# Patient Record
Sex: MALE | Race: OTHER | HISPANIC OR LATINO | Employment: UNEMPLOYED | URBAN - METROPOLITAN AREA
[De-identification: names, ages, dates, MRNs, and addresses within clinical notes are randomized per-mention and may not be internally consistent; named-entity substitution may affect disease eponyms.]

---
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
## 2023-04-30 ENCOUNTER — HOSPITAL ENCOUNTER (EMERGENCY)
Facility: HOSPITAL | Age: 18
Discharge: HOME | End: 2023-04-30
Attending: EMERGENCY MEDICINE

## 2023-04-30 ENCOUNTER — APPOINTMENT (EMERGENCY)
Dept: RADIOLOGY | Facility: HOSPITAL | Age: 18
End: 2023-04-30

## 2023-04-30 VITALS
HEART RATE: 101 BPM | RESPIRATION RATE: 20 BRPM | DIASTOLIC BLOOD PRESSURE: 90 MMHG | BODY MASS INDEX: 36.95 KG/M2 | WEIGHT: 280.1 LBS | OXYGEN SATURATION: 96 % | SYSTOLIC BLOOD PRESSURE: 183 MMHG | TEMPERATURE: 98.5 F

## 2023-04-30 DIAGNOSIS — R68.89 FLU-LIKE SYMPTOMS: Primary | ICD-10-CM

## 2023-04-30 RX ORDER — LORATADINE 10 MG/1
10 TABLET ORAL DAILY
Qty: 20 TABLET | Refills: 0 | Status: SHIPPED | OUTPATIENT
Start: 2023-04-30

## 2023-04-30 RX ORDER — PSEUDOEPHEDRINE HCL 30 MG
60 TABLET ORAL ONCE
Status: COMPLETED | OUTPATIENT
Start: 2023-04-30 | End: 2023-04-30

## 2023-04-30 RX ORDER — LORATADINE 10 MG/1
10 TABLET ORAL ONCE
Status: COMPLETED | OUTPATIENT
Start: 2023-04-30 | End: 2023-04-30

## 2023-04-30 RX ADMIN — PSEUDOEPHEDRINE HCL 60 MG: 30 TABLET, FILM COATED ORAL at 22:24

## 2023-04-30 RX ADMIN — LORATADINE 10 MG: 10 TABLET ORAL at 22:25

## 2023-04-30 NOTE — Clinical Note
Manda Tabares was seen and treated in our emergency department on 4/30/2023  Diagnosis:     Katelyn Romo  may return to work on return date  He may return on this date: 05/02/2023         If you have any questions or concerns, please don't hesitate to call        Armand Mcfarland MD    ______________________________           _______________          _______________  Hospital Representative                              Date                                Time

## 2023-05-01 NOTE — ED PROVIDER NOTES
History  Chief Complaint   Patient presents with    Flu Symptoms     Congestion, shortness of breath, cough  Has been working outside in the rain for the past few days that has been making his symptoms worse  66-year-old male presented emerged department with cough congestion shortness of breath  Patient notes that he works outside all day for [employer] and has been wet for the past 2 days  No chest pain  No nausea vomiting diarrhea  URI symptoms going on for 1 week  None       History reviewed  No pertinent past medical history  History reviewed  No pertinent surgical history  History reviewed  No pertinent family history  I have reviewed and agree with the history as documented  E-Cigarette/Vaping     E-Cigarette/Vaping Substances     Social History     Tobacco Use    Smoking status: Never    Smokeless tobacco: Never   Substance Use Topics    Alcohol use: Never    Drug use: Never       Review of Systems   Constitutional: Negative for chills and fever  HENT: Positive for congestion and rhinorrhea  Eyes: Negative for discharge and visual disturbance  Respiratory: Positive for cough  Negative for shortness of breath  Cardiovascular: Negative for chest pain and palpitations  Gastrointestinal: Negative for diarrhea, nausea and vomiting  Endocrine: Negative for polydipsia and polyphagia  Genitourinary: Negative for dysuria, flank pain and hematuria  Musculoskeletal: Positive for myalgias  Skin: Negative for pallor and rash  Psychiatric/Behavioral: Negative for confusion  Physical Exam  Physical Exam  Vitals and nursing note reviewed  Constitutional:       General: He is not in acute distress  Appearance: He is well-developed  HENT:      Head: Normocephalic and atraumatic  Nose: Congestion and rhinorrhea present  Mouth/Throat:      Mouth: Mucous membranes are moist       Pharynx: No oropharyngeal exudate or posterior oropharyngeal erythema     Eyes: Conjunctiva/sclera: Conjunctivae normal    Cardiovascular:      Rate and Rhythm: Normal rate and regular rhythm  Heart sounds: No murmur heard  Pulmonary:      Effort: Pulmonary effort is normal  No respiratory distress  Breath sounds: Normal breath sounds  Abdominal:      Palpations: Abdomen is soft  Tenderness: There is no abdominal tenderness  Musculoskeletal:         General: No swelling  Cervical back: Neck supple  Skin:     General: Skin is warm and dry  Capillary Refill: Capillary refill takes less than 2 seconds  Neurological:      Mental Status: He is alert and oriented to person, place, and time  Psychiatric:         Mood and Affect: Mood normal          Vital Signs  ED Triage Vitals [04/30/23 2216]   Temperature Pulse Respirations Blood Pressure SpO2   98 5 °F (36 9 °C) 101 20 (!) 183/90 96 %      Temp Source Heart Rate Source Patient Position - Orthostatic VS BP Location FiO2 (%)   Oral Monitor Sitting Left arm --      Pain Score       --           Vitals:    04/30/23 2216   BP: (!) 183/90   Pulse: 101   Patient Position - Orthostatic VS: Sitting         Visual Acuity      ED Medications  Medications   loratadine (CLARITIN) tablet 10 mg (10 mg Oral Given 4/30/23 2225)   pseudoephedrine (SUDAFED) tablet 60 mg (60 mg Oral Given 4/30/23 2224)       Diagnostic Studies  Results Reviewed     None                 XR chest 1 view portable    (Results Pending)              Procedures  Procedures         ED Course                                             Medical Decision Making  Well-appearing patient with upper respiratory tract infection symptoms but also shortness of breath     Differential includes viral infection, pneumonia, pneumothorax  Chest x-ray interpreted by me without acute cardiopulmonary disease  Likely viral URI, will treat with Claritin  Amount and/or Complexity of Data Reviewed  Radiology: ordered  Risk  OTC drugs            Disposition  Final diagnoses:   Flu-like symptoms     Time reflects when diagnosis was documented in both MDM as applicable and the Disposition within this note     Time User Action Codes Description Comment    4/30/2023 10:36 PM Roland Severe Add [R68 89] Flu-like symptoms       ED Disposition     ED Disposition   Discharge    Condition   Stable    Date/Time   Sun Apr 30, 2023 10:36 PM    Tawanda casiano discharge to home/self care  Follow-up Information     Follow up With Specialties Details Why Contact Info Additional [address]   [address], [address] [facility] [address]  [address], [address], [address] [place], [place], [address]          Patient's Medications   Discharge Prescriptions    LORATADINE (CLARITIN) 10 MG TABLET    Take 1 tablet (10 mg total) by mouth daily       Start Date: 4/30/2023 End Date: --       Order Dose: 10 mg       Quantity: 20 tablet    Refills: 0       No discharge procedures on file      PDMP Review     None          ED Provider  Electronically Signed by           Megan Davidson MD  04/30/23 4958

## 2023-06-19 ENCOUNTER — HOSPITAL ENCOUNTER (EMERGENCY)
Facility: HOSPITAL | Age: 18
Discharge: HOME | End: 2023-06-19
Attending: EMERGENCY MEDICINE
Payer: COMMERCIAL

## 2023-06-19 VITALS
OXYGEN SATURATION: 99 % | SYSTOLIC BLOOD PRESSURE: 146 MMHG | WEIGHT: 274.7 LBS | BODY MASS INDEX: 36.24 KG/M2 | HEART RATE: 85 BPM | RESPIRATION RATE: 16 BRPM | TEMPERATURE: 97 F | DIASTOLIC BLOOD PRESSURE: 92 MMHG

## 2023-06-19 DIAGNOSIS — K59.00 CONSTIPATION: ICD-10-CM

## 2023-06-19 DIAGNOSIS — R10.12 LEFT UPPER QUADRANT ABDOMINAL PAIN: Primary | ICD-10-CM

## 2023-06-19 PROCEDURE — 99283 EMERGENCY DEPT VISIT LOW MDM: CPT

## 2023-06-19 RX ORDER — POLYETHYLENE GLYCOL 3350 17 G/17G
17 POWDER, FOR SOLUTION ORAL DAILY
Qty: 51 G | Refills: 0 | Status: SHIPPED | OUTPATIENT
Start: 2023-06-19 | End: 2023-06-22

## 2023-06-19 RX ORDER — MAGNESIUM HYDROXIDE/ALUMINUM HYDROXICE/SIMETHICONE 120; 1200; 1200 MG/30ML; MG/30ML; MG/30ML
30 SUSPENSION ORAL ONCE
Status: COMPLETED | OUTPATIENT
Start: 2023-06-19 | End: 2023-06-19

## 2023-06-19 RX ORDER — PANTOPRAZOLE SODIUM 40 MG/1
40 TABLET, DELAYED RELEASE ORAL ONCE
Status: COMPLETED | OUTPATIENT
Start: 2023-06-19 | End: 2023-06-19

## 2023-06-19 RX ORDER — PANTOPRAZOLE SODIUM 20 MG/1
40 TABLET, DELAYED RELEASE ORAL DAILY
Qty: 28 TABLET | Refills: 0 | Status: SHIPPED | OUTPATIENT
Start: 2023-06-19 | End: 2023-07-03

## 2023-06-19 RX ADMIN — ALUMINUM HYDROXIDE, MAGNESIUM HYDROXIDE, AND SIMETHICONE 30 ML: 200; 200; 20 SUSPENSION ORAL at 12:16

## 2023-06-19 RX ADMIN — PANTOPRAZOLE SODIUM 40 MG: 40 TABLET, DELAYED RELEASE ORAL at 12:15

## 2023-06-19 NOTE — DISCHARGE INSTRUCTIONS
For constipation can take a capful of Miralax once per day for the next 3-5 days  Increase fiber and water intake  For abdominal pain start daily Protonix as directed  Must get re-evaluated by primary care physician in 1-2 days for recheck  Return to ER for severe pain, vomiting, any other symptoms that concern you

## 2023-06-19 NOTE — Clinical Note
Cyn Al was seen and treated in our emergency department on 6/19/2023  Diagnosis:     Cheko    He may return on this date: 06/20/2023         If you have any questions or concerns, please don't hesitate to call        Chele Meek MD    ______________________________           _______________          _______________  Hospital Representative                              Date                                Time

## 2023-06-19 NOTE — ED PROVIDER NOTES
History  Chief Complaint   Patient presents with   • Abdominal Pain     Patient states he has had abdominal pain since yesterday  No NVD  No pta medications     25-year-old male presents to the emergency department for left upper quadrant abdominal pain  Patient says pain started yesterday, currently 7 out of 10 in severity, constant, nonradiating, sharp  Tried Tylenol without relief  He has also been constipated and says his last bowel movement was 3 days ago  He has not taken anything for the constipation  Denies any lower abdominal pain or pain on the right side of his abdomen  Denies fever, chills, cough, chest pain, SOB, n/v/d, urinary symptoms, headache, any other complaints  No history of abdominal surgeries  Prior to Admission Medications   Prescriptions Last Dose Informant Patient Reported? Taking?   loratadine (CLARITIN) 10 mg tablet   No No   Sig: Take 1 tablet (10 mg total) by mouth daily      Facility-Administered Medications: None       History reviewed  No pertinent past medical history  History reviewed  No pertinent surgical history  History reviewed  No pertinent family history  I have reviewed and agree with the history as documented  E-Cigarette/Vaping     E-Cigarette/Vaping Substances     Social History     Tobacco Use   • Smoking status: Never   • Smokeless tobacco: Never   Substance Use Topics   • Alcohol use: Never   • Drug use: Never       Review of Systems   Constitutional: Negative  Negative for chills and fever  HENT: Negative  Negative for congestion and rhinorrhea  Eyes: Negative  Respiratory: Negative  Negative for cough and shortness of breath  Cardiovascular: Negative  Negative for chest pain and leg swelling  Gastrointestinal: Positive for abdominal pain  Negative for abdominal distention, diarrhea, nausea and vomiting  Musculoskeletal: Negative  Negative for back pain and neck pain  Skin: Negative  Negative for rash     Neurological: Negative  Negative for light-headedness and headaches  Hematological: Negative  All other systems reviewed and are negative  Physical Exam  Physical Exam  Vitals and nursing note reviewed  Constitutional:       General: He is not in acute distress  Appearance: He is well-developed  HENT:      Head: Normocephalic and atraumatic  Mouth/Throat:      Mouth: Mucous membranes are moist    Eyes:      Pupils: Pupils are equal, round, and reactive to light  Cardiovascular:      Rate and Rhythm: Normal rate and regular rhythm  Heart sounds: Normal heart sounds  No murmur heard  No friction rub  No gallop  Pulmonary:      Effort: Pulmonary effort is normal  No respiratory distress  Breath sounds: Normal breath sounds  No stridor  No wheezing, rhonchi or rales  Abdominal:      Palpations: Abdomen is soft  Tenderness: There is abdominal tenderness (mild) in the left upper quadrant  There is no right CVA tenderness, left CVA tenderness, guarding or rebound  Musculoskeletal:         General: No swelling or tenderness  Normal range of motion  Cervical back: Normal range of motion and neck supple  Right lower leg: No edema  Left lower leg: No edema  Skin:     General: Skin is warm and dry  Capillary Refill: Capillary refill takes less than 2 seconds  Neurological:      Mental Status: He is alert and oriented to person, place, and time        Comments: Clear fluent speech         Vital Signs  ED Triage Vitals [06/19/23 1146]   Temperature Pulse Respirations Blood Pressure SpO2   (!) 97 °F (36 1 °C) 85 16 146/92 99 %      Temp Source Heart Rate Source Patient Position - Orthostatic VS BP Location FiO2 (%)   Tympanic Monitor Sitting Left arm --      Pain Score       --           Vitals:    06/19/23 1146   BP: 146/92   Pulse: 85   Patient Position - Orthostatic VS: Sitting         Visual Acuity      ED Medications  Medications   pantoprazole (PROTONIX) EC tablet "40 mg (40 mg Oral Given 6/19/23 1215)   aluminum-magnesium hydroxide-simethicone (MYLANTA) oral suspension 30 mL (30 mL Oral Given 6/19/23 1216)       Diagnostic Studies  Results Reviewed     None                 No orders to display              Procedures  Procedures         ED Course         CRAFFT    Flowsheet Row Most Recent Value   CRAFFT Initial Screen: During the past 12 months, did you:    1  Drink any alcohol (more than a few sips)? No Filed at: 06/19/2023 1234   2  Smoke any marijuana or hashish No Filed at: 06/19/2023 1234   3  Use anything else to get high? (\"anything else\" includes illegal drugs, over the counter and prescription drugs, and things that you sniff or 'cortes')? No Filed at: 06/19/2023 1234                                          Medical Decision Making  78-year-old male presents to the emergency department for left upper quadrant abdominal pain  Within the differential consider gastritis, constipation  Extremely low concern for obstruction given lack of abdominal surgeries, largely benign exam, no vomiting  Presentation not suggestive of kidney stone or pyelonephritis  Will medicate with antacids and reassess  Final assessment: patient feels improved after medications  Provided prescriptions along with PCP referral  Strict ED return precautions provided should symptoms worsen and patient can otherwise follow up outpatient  Patient expresses an understanding and agreement with the plan and remains in good condition for discharge  Constipation: acute illness or injury  Left upper quadrant abdominal pain: acute illness or injury  Risk  OTC drugs  Prescription drug management            Disposition  Final diagnoses:   Left upper quadrant abdominal pain   Constipation     Time reflects when diagnosis was documented in both MDM as applicable and the Disposition within this note     Time User Action Codes Description Comment    6/19/2023 12:28 PM Carolyn Elizabeth Add [R10 12] Left upper " quadrant abdominal pain     6/19/2023 12:28 PM Caorlyn Elizabeth Add [K59 00] Constipation       ED Disposition     ED Disposition   Discharge    Condition   Stable    Date/Time   Mon Jun 19, 2023 12:28 PM    Tawanda Sarabia discharge to home/self care  Follow-up Information     Follow up With Specialties Details Why Contact Info Additional [address] [facility] Call in 1 day  [address] [facility] [address], [address] [facility] [id number]  [address], [address], [address], [address]          Discharge Medication List as of 6/19/2023 12:30 PM      START taking these medications    Details   pantoprazole (PROTONIX) 20 mg tablet Take 2 tablets (40 mg total) by mouth daily for 14 days, Starting Mon 6/19/2023, Until Mon 7/3/2023, Normal      polyethylene glycol (GLYCOLAX) 17 GM/SCOOP powder Take 17 g by mouth daily for 3 days, Starting Mon 6/19/2023, Until Thu 6/22/2023, Normal         CONTINUE these medications which have NOT CHANGED    Details   loratadine (CLARITIN) 10 mg tablet Take 1 tablet (10 mg total) by mouth daily, Starting Sun 4/30/2023, Normal             No discharge procedures on file      PDMP Review     None          ED Provider  Electronically Signed by           Sameer Aguilar MD  06/19/23 1241

## 2024-01-24 ENCOUNTER — HOSPITAL ENCOUNTER (EMERGENCY)
Facility: HOSPITAL | Age: 19
Discharge: HOME | End: 2024-01-24
Attending: EMERGENCY MEDICINE
Payer: COMMERCIAL

## 2024-01-24 VITALS
OXYGEN SATURATION: 95 % | TEMPERATURE: 98.4 F | DIASTOLIC BLOOD PRESSURE: 88 MMHG | WEIGHT: 253.9 LBS | RESPIRATION RATE: 18 BRPM | BODY MASS INDEX: 33.5 KG/M2 | HEART RATE: 81 BPM | SYSTOLIC BLOOD PRESSURE: 154 MMHG

## 2024-01-24 DIAGNOSIS — R51.9 HEADACHE: Primary | ICD-10-CM

## 2024-01-24 PROCEDURE — 96372 THER/PROPH/DIAG INJ SC/IM: CPT

## 2024-01-24 PROCEDURE — 99282 EMERGENCY DEPT VISIT SF MDM: CPT

## 2024-01-24 PROCEDURE — 99284 EMERGENCY DEPT VISIT MOD MDM: CPT | Performed by: EMERGENCY MEDICINE

## 2024-01-24 RX ORDER — IBUPROFEN 600 MG/1
600 TABLET ORAL EVERY 6 HOURS PRN
Qty: 30 TABLET | Refills: 0 | Status: SHIPPED | OUTPATIENT
Start: 2024-01-24

## 2024-01-24 RX ORDER — KETOROLAC TROMETHAMINE 30 MG/ML
15 INJECTION, SOLUTION INTRAMUSCULAR; INTRAVENOUS ONCE
Status: COMPLETED | OUTPATIENT
Start: 2024-01-24 | End: 2024-01-24

## 2024-01-24 RX ORDER — ACETAMINOPHEN 325 MG/1
650 TABLET ORAL ONCE
Status: COMPLETED | OUTPATIENT
Start: 2024-01-24 | End: 2024-01-24

## 2024-01-24 RX ADMIN — ACETAMINOPHEN 650 MG: 325 TABLET ORAL at 22:30

## 2024-01-24 RX ADMIN — KETOROLAC TROMETHAMINE 15 MG: 30 INJECTION, SOLUTION INTRAMUSCULAR; INTRAVENOUS at 22:31

## 2024-01-24 NOTE — Clinical Note
Cheko Parikh was seen and treated in our emergency department on 1/24/2024.                Diagnosis:     Cheko  may return to work on return date.    He may return on this date: 01/26/2024         If you have any questions or concerns, please don't hesitate to call.      Connor Hoang MD    ______________________________           _______________          _______________  Hospital Representative                              Date                                Time

## 2024-01-24 NOTE — Clinical Note
Cheko Parikh was seen and treated in our emergency department on 1/24/2024.                Diagnosis:     Cheko  may return to work on return date.    He may return on this date: 01/27/2024         If you have any questions or concerns, please don't hesitate to call.      Connor Hoang MD    ______________________________           _______________          _______________  Hospital Representative                              Date                                Time

## 2024-01-25 NOTE — ED PROVIDER NOTES
History  Chief Complaint   Patient presents with    Headache     Pt states he has had a headache since 2pm, took tylenol at 2:30pm. C/o of photosensitivity      18 yo M with head since 2 PM, took tylenol without relief. Photosensitivity. No n/v. No numbness or tingling.         Prior to Admission Medications   Prescriptions Last Dose Informant Patient Reported? Taking?   loratadine (CLARITIN) 10 mg tablet   No No   Sig: Take 1 tablet (10 mg total) by mouth daily   pantoprazole (PROTONIX) 20 mg tablet   No No   Sig: Take 2 tablets (40 mg total) by mouth daily for 14 days   polyethylene glycol (GLYCOLAX) 17 GM/SCOOP powder   No No   Sig: Take 17 g by mouth daily for 3 days      Facility-Administered Medications: None       History reviewed. No pertinent past medical history.    History reviewed. No pertinent surgical history.    History reviewed. No pertinent family history.  I have reviewed and agree with the history as documented.    E-Cigarette/Vaping    E-Cigarette Use Current Every Day User      E-Cigarette/Vaping Substances    Nicotine Yes      Social History     Tobacco Use    Smoking status: Never    Smokeless tobacco: Never   Vaping Use    Vaping status: Every Day    Substances: Nicotine   Substance Use Topics    Alcohol use: Never    Drug use: Yes     Types: Marijuana       Review of Systems   Constitutional:  Negative for chills and fever.   HENT:  Negative for ear pain and sore throat.    Eyes:  Negative for pain and visual disturbance.   Respiratory:  Negative for cough and shortness of breath.    Cardiovascular:  Negative for chest pain and palpitations.   Gastrointestinal:  Negative for abdominal pain and vomiting.   Genitourinary:  Negative for dysuria and hematuria.   Musculoskeletal:  Negative for arthralgias and back pain.   Skin:  Negative for color change and rash.   Neurological:  Positive for headaches. Negative for seizures and syncope.   All other systems reviewed and are  negative.      Physical Exam  Physical Exam  Vitals and nursing note reviewed.   Constitutional:       General: He is not in acute distress.     Appearance: He is well-developed.   HENT:      Head: Normocephalic and atraumatic.   Eyes:      Conjunctiva/sclera: Conjunctivae normal.   Cardiovascular:      Rate and Rhythm: Normal rate and regular rhythm.      Heart sounds: No murmur heard.  Pulmonary:      Effort: Pulmonary effort is normal. No respiratory distress.      Breath sounds: Normal breath sounds.   Abdominal:      Palpations: Abdomen is soft.      Tenderness: There is no abdominal tenderness.   Musculoskeletal:         General: No swelling.      Cervical back: Neck supple.   Skin:     General: Skin is warm and dry.      Capillary Refill: Capillary refill takes less than 2 seconds.   Neurological:      Mental Status: He is alert.   Psychiatric:         Mood and Affect: Mood normal.         Vital Signs  ED Triage Vitals   Temperature Pulse Respirations Blood Pressure SpO2   01/24/24 2220 01/24/24 2220 01/24/24 2220 01/24/24 2220 01/24/24 2220   98.4 °F (36.9 °C) 81 18 154/88 95 %      Temp Source Heart Rate Source Patient Position - Orthostatic VS BP Location FiO2 (%)   01/24/24 2220 01/24/24 2220 01/24/24 2220 01/24/24 2220 --   Oral Monitor Sitting Left arm       Pain Score       01/24/24 2230       8           Vitals:    01/24/24 2220   BP: 154/88   Pulse: 81   Patient Position - Orthostatic VS: Sitting         Visual Acuity      ED Medications  Medications   ketorolac (TORADOL) injection 15 mg (15 mg Intramuscular Given 1/24/24 2231)   acetaminophen (TYLENOL) tablet 650 mg (650 mg Oral Given 1/24/24 2230)       Diagnostic Studies  Results Reviewed       None                   No orders to display              Procedures  Procedures         ED Course                                             Medical Decision Making  19-year-old male with a headache with normal neurologic exam.  Differential diagnosis of  migraine, tension headache.  Headache resolved after symptomatic management.    Risk  OTC drugs.  Prescription drug management.             Disposition  Final diagnoses:   Headache     Time reflects when diagnosis was documented in both MDM as applicable and the Disposition within this note       Time User Action Codes Description Comment    1/24/2024 11:12 PM Connor Hoang Add [R51.9] Headache           ED Disposition       ED Disposition   Discharge    Condition   Stable    Date/Time   Wed Jan 24, 2024 11:12 PM    Comment   Cheko Parmar NathanEspinal discharge to home/self care.                   Follow-up Information       Follow up With Specialties Details Why Contact Info Additional Information    [facility]   [address] [place] [id number] [phone] [facility], [address] [place] [address], [address] [phone]            Patient's Medications   Discharge Prescriptions    IBUPROFEN (MOTRIN) 600 MG TABLET    Take 1 tablet (600 mg total) by mouth every 6 (six) hours as needed for mild pain       Start Date: 1/24/2024 End Date: --       Order Dose: 600 mg       Quantity: 30 tablet    Refills: 0       No discharge procedures on file.    PDMP Review       None            ED Provider  Electronically Signed by             Connor Hoang MD  01/24/24 9503

## 2024-02-07 ENCOUNTER — TELEPHONE (OUTPATIENT)
Dept: OBGYN CLINIC | Facility: MEDICAL CENTER | Age: 19
End: 2024-02-07

## 2024-02-07 NOTE — TELEPHONE ENCOUNTER
I tried to reach the patient at the # listed in his chart.  It states that the person is not able to receive messages at this time.  There is no emergence contact listed.  He does not have a medical communication consent on file.

## 2024-04-07 ENCOUNTER — APPOINTMENT (EMERGENCY)
Dept: RADIOLOGY | Facility: HOSPITAL | Age: 19
End: 2024-04-07
Payer: COMMERCIAL

## 2024-04-07 ENCOUNTER — HOSPITAL ENCOUNTER (EMERGENCY)
Facility: HOSPITAL | Age: 19
Discharge: HOME | End: 2024-04-07
Attending: EMERGENCY MEDICINE
Payer: COMMERCIAL

## 2024-04-07 VITALS
OXYGEN SATURATION: 98 % | WEIGHT: 237.5 LBS | SYSTOLIC BLOOD PRESSURE: 128 MMHG | DIASTOLIC BLOOD PRESSURE: 58 MMHG | RESPIRATION RATE: 20 BRPM | BODY MASS INDEX: 31.33 KG/M2 | HEART RATE: 98 BPM | TEMPERATURE: 98 F

## 2024-04-07 DIAGNOSIS — S89.92XA INJURY OF LEFT KNEE, INITIAL ENCOUNTER: Primary | ICD-10-CM

## 2024-04-07 DIAGNOSIS — D16.22 OSTEOCHONDROMA OF LEFT FEMUR: ICD-10-CM

## 2024-04-07 PROCEDURE — 73564 X-RAY EXAM KNEE 4 OR MORE: CPT

## 2024-04-07 RX ORDER — KETOROLAC TROMETHAMINE 30 MG/ML
15 INJECTION, SOLUTION INTRAMUSCULAR; INTRAVENOUS ONCE
Status: COMPLETED | OUTPATIENT
Start: 2024-04-07 | End: 2024-04-07

## 2024-04-07 RX ADMIN — KETOROLAC TROMETHAMINE 15 MG: 30 INJECTION, SOLUTION INTRAMUSCULAR; INTRAVENOUS at 20:44

## 2024-04-07 NOTE — Clinical Note
Cheko Parikh was seen and treated in our emergency department on 4/7/2024.        No work until cleared by Family Doctor/Orthopedics        Diagnosis:     Cheko  .    He may return on this date:          If you have any questions or concerns, please don't hesitate to call.      Noreen Tran PA-C    ______________________________           _______________          _______________  Hospital Representative                              Date                                Time

## 2024-04-08 NOTE — ED PROVIDER NOTES
"History  Chief Complaint   Patient presents with    Knee Pain     Pt states that he hurt his L knee this AM. Hard to put weight on leg. Motrin taking this am     The patient is an 19-year-old male with a past medical history of left osteochondroma, who presents for evaluation of left knee pain.  He states this morning he was working on his car and when he went to  his transmission he tried to pivot but his left knee did not twist correctly and he heard a \"crack\" on the inside of his knee.  Since that time he has experiencing worsening pain and swelling of the knee.  He does have limitations in knee flexion at baseline due to the osteochondroma, however he states his mobility has significantly worsened since this morning.  Associated symptoms include difficulty with ambulation.  Last dose of ibuprofen was taken shortly after the injury.        Prior to Admission Medications   Prescriptions Last Dose Informant Patient Reported? Taking?   ibuprofen (MOTRIN) 600 mg tablet   No No   Sig: Take 1 tablet (600 mg total) by mouth every 6 (six) hours as needed for mild pain   loratadine (CLARITIN) 10 mg tablet   No No   Sig: Take 1 tablet (10 mg total) by mouth daily   pantoprazole (PROTONIX) 20 mg tablet   No No   Sig: Take 2 tablets (40 mg total) by mouth daily for 14 days   polyethylene glycol (GLYCOLAX) 17 GM/SCOOP powder   No No   Sig: Take 17 g by mouth daily for 3 days      Facility-Administered Medications: None       History reviewed. No pertinent past medical history.    History reviewed. No pertinent surgical history.    History reviewed. No pertinent family history.  I have reviewed and agree with the history as documented.    E-Cigarette/Vaping    E-Cigarette Use Current Every Day User      E-Cigarette/Vaping Substances    Nicotine Yes      Social History     Tobacco Use    Smoking status: Never    Smokeless tobacco: Never   Vaping Use    Vaping status: Every Day    Substances: Nicotine   Substance Use " Topics    Alcohol use: Never    Drug use: Yes     Types: Marijuana       Review of Systems   Constitutional:  Negative for chills and fever.   HENT:  Negative for ear pain and sore throat.    Eyes:  Negative for pain and visual disturbance.   Respiratory:  Negative for cough and shortness of breath.    Cardiovascular:  Negative for chest pain and palpitations.   Gastrointestinal:  Negative for abdominal pain and vomiting.   Genitourinary:  Negative for dysuria and hematuria.   Musculoskeletal:  Positive for arthralgias, gait problem and joint swelling. Negative for back pain.   Skin:  Negative for color change, rash and wound.   Neurological:  Negative for seizures, syncope and numbness.   All other systems reviewed and are negative.      Physical Exam  Physical Exam  Vitals and nursing note reviewed.   Constitutional:       General: He is awake. He is not in acute distress.     Appearance: Normal appearance. He is well-developed and overweight. He is not toxic-appearing or diaphoretic.   HENT:      Head: Normocephalic and atraumatic.      Right Ear: External ear normal.      Left Ear: External ear normal.      Nose: Nose normal.      Mouth/Throat:      Lips: Pink.      Mouth: Mucous membranes are moist.   Eyes:      General: Lids are normal. Vision grossly intact. Gaze aligned appropriately.      Conjunctiva/sclera: Conjunctivae normal.      Pupils: Pupils are equal, round, and reactive to light.   Cardiovascular:      Rate and Rhythm: Normal rate and regular rhythm.   Pulmonary:      Effort: Pulmonary effort is normal. No respiratory distress.   Musculoskeletal:      Cervical back: Full passive range of motion without pain and neck supple.      Comments: Tenderness to palpation of the left anterior knee, most notable along the lateral joint line.  There is also less significant tenderness over the posterior knee (location of patient's known osteochondroma).  Decreased ROM with flexion, but patient can fully extend  the joint. Pain is reproduced with flexion and valgus stress.  No palpable joint effusion or crepitus.   Skin:     General: Skin is warm.      Capillary Refill: Capillary refill takes less than 2 seconds.      Coloration: Skin is not pale.      Findings: Bruising present. No rash.   Neurological:      Mental Status: He is alert and oriented to person, place, and time.   Psychiatric:         Attention and Perception: Attention normal.         Mood and Affect: Mood normal.         Speech: Speech normal.         Behavior: Behavior normal. Behavior is cooperative.         Vital Signs  ED Triage Vitals   Temperature Pulse Respirations Blood Pressure SpO2   04/07/24 2045 04/07/24 2023 04/07/24 2023 04/07/24 2023 04/07/24 2023   98 °F (36.7 °C) 98 20 (!) 177/97 98 %      Temp src Heart Rate Source Patient Position - Orthostatic VS BP Location FiO2 (%)   -- 04/07/24 2023 04/07/24 2023 04/07/24 2023 --    Monitor Sitting Left arm       Pain Score       04/07/24 2023       7           Vitals:    04/07/24 2023 04/07/24 2115   BP: (!) 177/97 128/58   Pulse: 98    Patient Position - Orthostatic VS: Sitting        ED Medications  Medications   ketorolac (TORADOL) injection 15 mg (15 mg Intramuscular Given 4/7/24 2044)       Diagnostic Studies  Results Reviewed       None                   XR knee 4+ views left injury   ED Interpretation by Noreen Tran PA-C (04/07 2100)   No acute osseous abnormality.  Revisualized osteochondroma similar to prior study in 7/2022.                 Procedures  Procedures         ED Course           SBIRT 20yo+      Flowsheet Row Most Recent Value   Initial Alcohol Screen: US AUDIT-C     1. How often do you have a drink containing alcohol? 0 Filed at: 04/07/2024 2024   2. How many drinks containing alcohol do you have on a typical day you are drinking?  0 Filed at: 04/07/2024 2024   3a. Male UNDER 65: How often do you have five or more drinks on one occasion? 0 Filed at: 04/07/2024 2024   3b.  "FEMALE Any Age, or MALE 65+: How often do you have 4 or more drinks on one occassion? 0 Filed at: 04/07/2024 2024   Audit-C Score 0 Filed at: 04/07/2024 2024   DOUGLAS: How many times in the past year have you...    Used an illegal drug or used a prescription medication for non-medical reasons? Weekly Filed at: 04/07/2024 2024   DAST-10: In the past 12 months...    1. Have you used drugs other than those required for medical reasons? 1 Filed at: 04/07/2024 2024   2. Do you use more than one drug at a time? 0 Filed at: 04/07/2024 2024   3. Have you had medical problems as a result of your drug use (e.g., memory loss, hepatitis, convulsions, bleeding, etc.)? 0 Filed at: 04/07/2024 2024   4. Have you had \"blackouts\" or \"flashbacks\" as a result of drug use?YesNo 0 Filed at: 04/07/2024 2024   5. Do you ever feel bad or guilty about your drug use? 0 Filed at: 04/07/2024 2024   6. Does your spouse (or parent) ever complain about your involvement with drugs? 0 Filed at: 04/07/2024 2024   7. Have you neglected your family because of your use of drugs? 0 Filed at: 04/07/2024 2024   8. Have you engaged in illegal activities in order to obtain drugs? 0 Filed at: 04/07/2024 2024   9. Have you ever experienced withdrawal symptoms (felt sick) when you stopped taking drugs? 0 Filed at: 04/07/2024 2024   10. Are you always able to stop using drugs when you want to? 0 Filed at: 04/07/2024 2024   DAST-10 Score 1 Filed at: 04/07/2024 2024                Medical Decision Making  Patient with a history of a left distal femur osteochondroma, presents for acute left knee pain following a twisting injury this morning.  The medial and lateral aspects of the knee are swollen, but there is no crepitus.  The extremity is neurovascularly intact.  Differential diagnosis includes but is not limited to femur fracture, tibial fracture, MCL injury, medial meniscus injury, contusion, hematoma, or Baker's cyst.  On my personal interpretation of the left " knee x-rays there is no acute osseous abnormality, but imaging did redemonstrate the patient's known osteochondroma.  Patient placed in a knee immobilizer and given crutches.  Referral placed for orthopedics.  No work until cleared by Ortho.  Patient is in agreement with the treatment plan and all questions were answered.  Strict return precautions discussed and he verbalized understanding.  Follow-up with PCP and orthopedics, but return to the ED in the interim with new or worsening symptoms.    Problems Addressed:  Injury of left knee, initial encounter: acute illness or injury  Osteochondroma of left femur: acute illness or injury    Amount and/or Complexity of Data Reviewed  External Data Reviewed: radiology and notes.  Radiology: ordered and independent interpretation performed.    Risk  Prescription drug management.             Disposition  Final diagnoses:   Injury of left knee, initial encounter   Osteochondroma of left femur     Time reflects when diagnosis was documented in both MDM as applicable and the Disposition within this note       Time User Action Codes Description Comment    4/7/2024  9:05 PM Noreen Tran Add [S89.92XA] Injury of left knee, initial encounter     4/7/2024  9:06 PM Noreen Tran Add [D16.22] Osteochondroma of left femur           ED Disposition       ED Disposition   Discharge    Condition   Stable    Date/Time   Sun Apr 7, 2024 2106    Comment   Cheko EvansEspinal discharge to home/self care.                   Follow-up Information       Follow up With Specialties Details Why Contact Info Additional Information    [facility]   [address] [place] [address], [address] [address] [phone] [facility], [address] [place] [address], [place] [address], [place], [place], [address] [phone]    [facility]   [facility] [address]  [facility] [address]  [phone] [facility] [address], [place], [place], [address] [phone]            Discharge Medication List as of 4/7/2024  9:07 PM        CONTINUE these medications which have NOT CHANGED    Details   ibuprofen (MOTRIN) 600 mg tablet Take 1 tablet (600 mg total) by mouth every 6 (six) hours as needed for mild pain, Starting Wed 1/24/2024, Normal      loratadine (CLARITIN) 10 mg tablet Take 1 tablet (10 mg total) by mouth daily, Starting Sun 4/30/2023, Normal      pantoprazole (PROTONIX) 20 mg tablet Take 2 tablets (40 mg total) by mouth daily for 14 days, Starting Mon 6/19/2023, Until Mon 7/3/2023, Normal      polyethylene glycol (GLYCOLAX) 17 GM/SCOOP powder Take 17 g by mouth daily for 3 days, Starting Mon 6/19/2023, Until Thu 6/22/2023, Normal                 PDMP Review       None            ED Provider  Electronically Signed by             Noreen Tran PA-C  04/07/24 8449

## 2024-04-11 ENCOUNTER — OFFICE VISIT (OUTPATIENT)
Dept: OBGYN CLINIC | Facility: MEDICAL CENTER | Age: 19
End: 2024-04-11
Payer: COMMERCIAL

## 2024-04-11 VITALS
DIASTOLIC BLOOD PRESSURE: 75 MMHG | HEART RATE: 64 BPM | HEIGHT: 73 IN | BODY MASS INDEX: 31.41 KG/M2 | WEIGHT: 237 LBS | SYSTOLIC BLOOD PRESSURE: 125 MMHG

## 2024-04-11 DIAGNOSIS — M23.92 INTERNAL DERANGEMENT OF LEFT KNEE: ICD-10-CM

## 2024-04-11 DIAGNOSIS — M25.562 ACUTE PAIN OF LEFT KNEE: ICD-10-CM

## 2024-04-11 DIAGNOSIS — D16.9 OSTEOCHONDROMA: Primary | ICD-10-CM

## 2024-04-11 PROCEDURE — 99214 OFFICE O/P EST MOD 30 MIN: CPT | Performed by: STUDENT IN AN ORGANIZED HEALTH CARE EDUCATION/TRAINING PROGRAM
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
## 2024-04-11 NOTE — PROGRESS NOTES
"Orthopedic Oncology Surgery Office Note  Cheko Parikh (19 y.o. male)  : 2005 Encounter Date: 2024  Dr. Lazaro Martinez DO, Orthopedic Surgeon  [facility]th[facility] Oncology [facility]   Phone:[phone] Fax:[phone]    Assessment, Plan, & Discussion:   Cheko Parikh is a 19 y.o. male with:    1.  ***  - ***    #. ***  - ***    #. Comorbidity, including: ***  - continue current management     Surgical Planning:   { surgery dot phrases:83200}    Follow Up & Tasks:     No follow-ups on file.     Tasks:  ***  ___________________________________________________________________________    History of Present Illness:     Cheko Parikh is a 19 y.o. male with history of *** who presents { visit reasonin} regarding ***    ***    At baseline patient gaits *** assistance.  {Actions; denies-reports:08972} constitutional symptoms such as fever, chills, night sweats, fatigue, weight gains/losses. {Actions; denies-reports:81953}  chest pain/shortness of breath.  Patient {Actions; denies-reports:72414}  personal history of cancer.    Occupation: ***    Review of Systems:   Allergies, medications, past medical/surgical/family/social history have been reviewed.  Complete 12 system review performed and found to be negative except: except as per mentioned in HPI.    Oncology and Treatment History:      Review of referring provider's records:  Referring provider: Self, Referral  Date: ***  Impression: ***    Patient Care team:   No care team member to display     Oncology History    No history exists.       Physical Examination:     Height: 6' 1\" (185.4 cm)  Weight - Scale: 108 kg (237 lb)  BMI (Calculated): 31.3  BSA (Calculated - m2): 2.31 sq meters     Vitals:    24 0859   BP: 125/75   Pulse: 64     Body mass index is 31.27 kg/m².    General: alert and oriented x ***; well nourished/well developed; no apparent distress.   Present with ***  Psychiatric: normal " mood and affect  HEENT: NCAT. Head/neck - full range of motion.   Lungs: breathing comfortably; equal symmetric chest expansion.   Abdomen: soft, non-tender, non-distended.   Skin: warm; dry; no lesions, rashes, petechiae or purpura; no clubbing, no cyanosis, no edema, *** palpable masses.    Extremity: {Laterality:44017} ***   Inspection: no edema, skin abnormalities throughout   Palpation: *** palpable masses or lesions   Range of motion of joints: *** range of motion all extremities.   Motor strength: WNL all extremities.  ***. Dorsal/Plantar flexion: ***.   Sensation: grossly intact to all extremities.    Pulses: ***   Lymphatics: (***) lymphadenopathy  Gait: normal gait.      Imaging Results:   All images personally review today by Dr. Martinez    Study: ***  Date: ***  Report: { radiology report:37535}  My impression is as follows: ***    Study: ***  Date: ***  Report: { radiology report:68209}  My impression is as follows: ***    Study: ***  Date: ***  Report: { radiology report:37480}  My impression is as follows: ***    Pathology & Pertinent Laboratory Findings:      Pertinent laboratory findings:  ***    Pathology: { path:07346}  ***    Microbiology:  Cultures: ***    Medical, Surgical, Family, and Social History      No past medical history on file.  No past surgical history on file.    Current Outpatient Medications:   •  ibuprofen (MOTRIN) 600 mg tablet, Take 1 tablet (600 mg total) by mouth every 6 (six) hours as needed for mild pain, Disp: 30 tablet, Rfl: 0  •  loratadine (CLARITIN) 10 mg tablet, Take 1 tablet (10 mg total) by mouth daily (Patient not taking: Reported on 4/11/2024), Disp: 20 tablet, Rfl: 0  •  pantoprazole (PROTONIX) 20 mg tablet, Take 2 tablets (40 mg total) by mouth daily for 14 days (Patient not taking: Reported on 4/11/2024), Disp: 28 tablet, Rfl: 0  •  polyethylene glycol (GLYCOLAX) 17 GM/SCOOP powder, Take 17 g by mouth daily for 3 days (Patient not taking: Reported  on 4/11/2024), Disp: 51 g, Rfl: 0  No Known Allergies  No family history on file.  Social History     Socioeconomic History   • Marital status: Single     Spouse name: Not on file   • Number of children: Not on file   • Years of education: Not on file   • Highest education level: Not on file   Occupational History   • Not on file   Tobacco Use   • Smoking status: Never   • Smokeless tobacco: Never   Vaping Use   • Vaping status: Every Day   • Substances: Nicotine   Substance and Sexual Activity   • Alcohol use: Never   • Drug use: Yes     Types: Marijuana   • Sexual activity: Not on file   Other Topics Concern   • Not on file   Social History Narrative   • Not on file     Social Determinants of Health     Financial Resource Strain: Not on file   Food Insecurity: Not on file   Transportation Needs: Not on file   Physical Activity: Not on file   Stress: Not on file   Social Connections: Not on file   Intimate Partner Violence: Not on file   Housing Stability: Not on file       This Visit:     *** minutes was spent in the coordination of care, reviewing of imaging and with the patient on the date of service    Scribe Attestation    I,:   am acting as a scribe while in the presence of the attending physician.:       I,:   personally performed the services described in this documentation    as scribed in my presence.:            Lilia Maria   4/11/2024 9:05 AM       Associated Orders:   {Assess/PlanSmartLinks:36078}

## 2024-04-11 NOTE — LETTER
April 11, 2024     Patient: Cheko Parikh  YOB: 2005  Date of Visit: 4/11/2024      To Whom it May Concern:    Cheko Parikh is under my professional care. Cheko was seen in my office on 4/11/2024. Cheko should remain out of work until cleared by a physician. He will be seen for re-evaluation following further imaging.    If you have any questions or concerns, please don't hesitate to call.         Sincerely,          Lazaro Martinez,         CC: No Recipients

## 2024-04-22 ENCOUNTER — HOSPITAL ENCOUNTER (OUTPATIENT)
Dept: MRI IMAGING | Facility: HOSPITAL | Age: 19
Discharge: HOME | End: 2024-04-22
Attending: STUDENT IN AN ORGANIZED HEALTH CARE EDUCATION/TRAINING PROGRAM
Payer: COMMERCIAL

## 2024-04-22 DIAGNOSIS — D16.9 OSTEOCHONDROMA: ICD-10-CM

## 2024-04-22 DIAGNOSIS — M23.92 INTERNAL DERANGEMENT OF LEFT KNEE: ICD-10-CM

## 2024-04-22 PROCEDURE — 73723 MRI JOINT LWR EXTR W/O&W/DYE: CPT

## 2024-04-22 PROCEDURE — A9585 GADOBUTROL INJECTION: HCPCS | Performed by: STUDENT IN AN ORGANIZED HEALTH CARE EDUCATION/TRAINING PROGRAM

## 2024-04-22 RX ORDER — GADOBUTROL 604.72 MG/ML
10 INJECTION INTRAVENOUS
Status: COMPLETED | OUTPATIENT
Start: 2024-04-22 | End: 2024-04-22

## 2024-04-22 RX ADMIN — GADOBUTROL 10 ML: 604.72 INJECTION INTRAVENOUS at 13:49

## 2024-05-01 ENCOUNTER — HOSPITAL ENCOUNTER (OUTPATIENT)
Dept: CT IMAGING | Facility: HOSPITAL | Age: 19
Discharge: HOME | End: 2024-05-01
Attending: STUDENT IN AN ORGANIZED HEALTH CARE EDUCATION/TRAINING PROGRAM
Payer: COMMERCIAL

## 2024-05-01 DIAGNOSIS — M25.562 ACUTE PAIN OF LEFT KNEE: ICD-10-CM

## 2024-05-01 DIAGNOSIS — D16.9 OSTEOCHONDROMA: ICD-10-CM

## 2024-05-01 PROCEDURE — 73700 CT LOWER EXTREMITY W/O DYE: CPT

## 2024-05-16 ENCOUNTER — OFFICE VISIT (OUTPATIENT)
Dept: OBGYN CLINIC | Facility: MEDICAL CENTER | Age: 19
End: 2024-05-16
Payer: COMMERCIAL

## 2024-05-16 VITALS
HEART RATE: 68 BPM | WEIGHT: 230 LBS | HEIGHT: 73 IN | SYSTOLIC BLOOD PRESSURE: 152 MMHG | BODY MASS INDEX: 30.48 KG/M2 | DIASTOLIC BLOOD PRESSURE: 90 MMHG

## 2024-05-16 DIAGNOSIS — D16.22 OSTEOCHONDROMA OF FEMUR, LEFT: Primary | ICD-10-CM

## 2024-05-16 PROCEDURE — 99215 OFFICE O/P EST HI 40 MIN: CPT | Performed by: STUDENT IN AN ORGANIZED HEALTH CARE EDUCATION/TRAINING PROGRAM

## 2024-05-16 RX ORDER — ACETAMINOPHEN 325 MG/1
975 TABLET ORAL ONCE
OUTPATIENT
Start: 2024-05-16 | End: 2024-05-16

## 2024-05-16 RX ORDER — CEFAZOLIN SODIUM 2 G/50ML
2000 SOLUTION INTRAVENOUS ONCE
OUTPATIENT
Start: 2024-05-16 | End: 2024-05-16

## 2024-05-16 RX ORDER — CHLORHEXIDINE GLUCONATE 40 MG/ML
SOLUTION TOPICAL DAILY PRN
OUTPATIENT
Start: 2024-05-16

## 2024-05-16 RX ORDER — CHLORHEXIDINE GLUCONATE ORAL RINSE 1.2 MG/ML
15 SOLUTION DENTAL ONCE
OUTPATIENT
Start: 2024-05-16 | End: 2024-05-16
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
## 2024-05-16 NOTE — LETTER
May 16, 2024     Patient: Cheko Parikh  YOB: 2005  Date of Visit: 5/16/2024      To Whom it May Concern:    Cheko Parikh is under my professional care. Cheko was seen in my office on 5/16/2024. Cheko is in need of surgery which will be performed on may 29th. He may not return to work until he is cleared after the first post operative visit    If you have any questions or concerns, please don't hesitate to call.         Sincerely,          Lazaro Martinez DO        CC: No Recipients  
Identified Risk Factors Documented?: yes
Risk Assessment Explanation (Does Not Render In The Note): Clinical determination of the probability and/or consequences of an event, such as surgery. Clinical assessment of the level of risk is affected by the nature of the event under consideration for the patient. Modifier 57 is used to indicate an Evaluation and Management (E/M) service resulted in the initial decision to perform surgery either the day before a major surgery (90 day global) or the day of a major surgery.
Complexity (Necessary For Coding; Major - 90 Day Global With Some Exceptions; Minor - 10 Day Global): minor
Discussion: Discussed with the patient the need for biopsy of the lesion and the possible risk factors associated with the procedure.

## 2024-05-26 ENCOUNTER — ANESTHESIA EVENT (OUTPATIENT)
Dept: PERIOP | Facility: HOSPITAL | Age: 19
End: 2024-05-26
Payer: COMMERCIAL

## 2024-05-28 ENCOUNTER — APPOINTMENT (OUTPATIENT)
Dept: LAB | Facility: HOSPITAL | Age: 19
End: 2024-05-28
Payer: COMMERCIAL

## 2024-05-28 DIAGNOSIS — D16.22 OSTEOCHONDROMA OF FEMUR, LEFT: ICD-10-CM

## 2024-05-28 LAB
ALBUMIN SERPL BCP-MCNC: 4.6 G/DL (ref 3.5–5)
ALP SERPL-CCNC: 49 U/L (ref 34–104)
ALT SERPL W P-5'-P-CCNC: 43 U/L (ref 7–52)
ANION GAP SERPL CALCULATED.3IONS-SCNC: 8 MMOL/L (ref 4–13)
APTT PPP: 32 SECONDS (ref 23–37)
AST SERPL W P-5'-P-CCNC: 23 U/L (ref 13–39)
BASOPHILS # BLD AUTO: 0.03 THOUSANDS/ÂΜL (ref 0–0.1)
BASOPHILS NFR BLD AUTO: 0 % (ref 0–1)
BILIRUB SERPL-MCNC: 0.82 MG/DL (ref 0.2–1)
BUN SERPL-MCNC: 11 MG/DL (ref 5–25)
CALCIUM SERPL-MCNC: 9.5 MG/DL (ref 8.4–10.2)
CHLORIDE SERPL-SCNC: 103 MMOL/L (ref 96–108)
CO2 SERPL-SCNC: 28 MMOL/L (ref 21–32)
CREAT SERPL-MCNC: 0.77 MG/DL (ref 0.6–1.3)
EOSINOPHIL # BLD AUTO: 0.44 THOUSAND/ÂΜL (ref 0–0.61)
EOSINOPHIL NFR BLD AUTO: 6 % (ref 0–6)
ERYTHROCYTE [DISTWIDTH] IN BLOOD BY AUTOMATED COUNT: 12.4 % (ref 11.6–15.1)
GFR SERPL CREATININE-BSD FRML MDRD: 129 ML/MIN/1.73SQ M
GLUCOSE P FAST SERPL-MCNC: 88 MG/DL (ref 65–99)
HCT VFR BLD AUTO: 42.4 % (ref 36.5–49.3)
HGB BLD-MCNC: 15.1 G/DL (ref 12–17)
IMM GRANULOCYTES # BLD AUTO: 0.02 THOUSAND/UL (ref 0–0.2)
IMM GRANULOCYTES NFR BLD AUTO: 0 % (ref 0–2)
INR PPP: 1.01 (ref 0.84–1.19)
LYMPHOCYTES # BLD AUTO: 1.52 THOUSANDS/ÂΜL (ref 0.6–4.47)
LYMPHOCYTES NFR BLD AUTO: 21 % (ref 14–44)
MCH RBC QN AUTO: 31.2 PG (ref 26.8–34.3)
MCHC RBC AUTO-ENTMCNC: 35.6 G/DL (ref 31.4–37.4)
MCV RBC AUTO: 88 FL (ref 82–98)
MONOCYTES # BLD AUTO: 0.59 THOUSAND/ÂΜL (ref 0.17–1.22)
MONOCYTES NFR BLD AUTO: 8 % (ref 4–12)
NEUTROPHILS # BLD AUTO: 4.65 THOUSANDS/ÂΜL (ref 1.85–7.62)
NEUTS SEG NFR BLD AUTO: 65 % (ref 43–75)
NRBC BLD AUTO-RTO: 0 /100 WBCS
PLATELET # BLD AUTO: 191 THOUSANDS/UL (ref 149–390)
PMV BLD AUTO: 11.3 FL (ref 8.9–12.7)
POTASSIUM SERPL-SCNC: 3.9 MMOL/L (ref 3.5–5.3)
PROT SERPL-MCNC: 7.4 G/DL (ref 6.4–8.4)
PROTHROMBIN TIME: 13.6 SECONDS (ref 11.6–14.5)
RBC # BLD AUTO: 4.84 MILLION/UL (ref 3.88–5.62)
SODIUM SERPL-SCNC: 139 MMOL/L (ref 135–147)
WBC # BLD AUTO: 7.25 THOUSAND/UL (ref 4.31–10.16)

## 2024-05-28 PROCEDURE — 85610 PROTHROMBIN TIME: CPT

## 2024-05-28 PROCEDURE — 36415 COLL VENOUS BLD VENIPUNCTURE: CPT

## 2024-05-28 PROCEDURE — 85730 THROMBOPLASTIN TIME PARTIAL: CPT

## 2024-05-28 PROCEDURE — 80053 COMPREHEN METABOLIC PANEL: CPT

## 2024-05-28 PROCEDURE — 85025 COMPLETE CBC W/AUTO DIFF WBC: CPT

## 2024-05-29 ENCOUNTER — APPOINTMENT (INPATIENT)
Dept: RADIOLOGY | Facility: HOSPITAL | Age: 19
End: 2024-05-29
Payer: COMMERCIAL

## 2024-05-29 ENCOUNTER — ANESTHESIA (OUTPATIENT)
Dept: PERIOP | Facility: HOSPITAL | Age: 19
End: 2024-05-29
Payer: COMMERCIAL

## 2024-05-29 ENCOUNTER — HOSPITAL ENCOUNTER (OUTPATIENT)
Facility: HOSPITAL | Age: 19
Setting detail: OUTPATIENT SURGERY
Discharge: HOME | End: 2024-05-29
Attending: STUDENT IN AN ORGANIZED HEALTH CARE EDUCATION/TRAINING PROGRAM | Admitting: STUDENT IN AN ORGANIZED HEALTH CARE EDUCATION/TRAINING PROGRAM
Payer: COMMERCIAL

## 2024-05-29 ENCOUNTER — APPOINTMENT (OUTPATIENT)
Dept: RADIOLOGY | Facility: HOSPITAL | Age: 19
End: 2024-05-29
Payer: COMMERCIAL

## 2024-05-29 VITALS
HEART RATE: 76 BPM | BODY MASS INDEX: 30.91 KG/M2 | RESPIRATION RATE: 18 BRPM | SYSTOLIC BLOOD PRESSURE: 152 MMHG | TEMPERATURE: 98 F | DIASTOLIC BLOOD PRESSURE: 70 MMHG | WEIGHT: 233.25 LBS | OXYGEN SATURATION: 99 % | HEIGHT: 73 IN

## 2024-05-29 DIAGNOSIS — Z47.89 AFTERCARE FOLLOWING SURGERY OF THE MUSCULOSKELETAL SYSTEM: ICD-10-CM

## 2024-05-29 DIAGNOSIS — D16.22 OSTEOCHONDROMA OF FEMUR, LEFT: Primary | ICD-10-CM

## 2024-05-29 PROCEDURE — 27365 RESECT FEMUR/KNEE TUMOR: CPT

## 2024-05-29 PROCEDURE — 15738 MUSCLE-SKIN GRAFT LEG: CPT | Performed by: STUDENT IN AN ORGANIZED HEALTH CARE EDUCATION/TRAINING PROGRAM

## 2024-05-29 PROCEDURE — 88309 TISSUE EXAM BY PATHOLOGIST: CPT | Performed by: PATHOLOGY

## 2024-05-29 PROCEDURE — 73552 X-RAY EXAM OF FEMUR 2/>: CPT

## 2024-05-29 PROCEDURE — 15738 MUSCLE-SKIN GRAFT LEG: CPT

## 2024-05-29 PROCEDURE — 88311 DECALCIFY TISSUE: CPT | Performed by: PATHOLOGY

## 2024-05-29 PROCEDURE — 97167 OT EVAL HIGH COMPLEX 60 MIN: CPT

## 2024-05-29 PROCEDURE — 97163 PT EVAL HIGH COMPLEX 45 MIN: CPT

## 2024-05-29 PROCEDURE — 27365 RESECT FEMUR/KNEE TUMOR: CPT | Performed by: STUDENT IN AN ORGANIZED HEALTH CARE EDUCATION/TRAINING PROGRAM

## 2024-05-29 RX ORDER — LIDOCAINE HYDROCHLORIDE 20 MG/ML
INJECTION, SOLUTION EPIDURAL; INFILTRATION; INTRACAUDAL; PERINEURAL AS NEEDED
Status: DISCONTINUED | OUTPATIENT
Start: 2024-05-29 | End: 2024-05-29

## 2024-05-29 RX ORDER — LIDOCAINE HYDROCHLORIDE AND EPINEPHRINE 10; 10 MG/ML; UG/ML
INJECTION, SOLUTION INFILTRATION; PERINEURAL AS NEEDED
Status: DISCONTINUED | OUTPATIENT
Start: 2024-05-29 | End: 2024-05-29 | Stop reason: HOSPADM

## 2024-05-29 RX ORDER — DEXAMETHASONE SODIUM PHOSPHATE 10 MG/ML
INJECTION, SOLUTION INTRAMUSCULAR; INTRAVENOUS AS NEEDED
Status: DISCONTINUED | OUTPATIENT
Start: 2024-05-29 | End: 2024-05-29 | Stop reason: HOSPADM

## 2024-05-29 RX ORDER — OXYCODONE HYDROCHLORIDE 5 MG/1
5 CAPSULE ORAL EVERY 4 HOURS PRN
Qty: 30 CAPSULE | Refills: 0 | Status: SHIPPED | OUTPATIENT
Start: 2024-05-29 | End: 2024-06-08

## 2024-05-29 RX ORDER — CEFADROXIL 500 MG/1
500 CAPSULE ORAL EVERY 12 HOURS SCHEDULED
Qty: 14 CAPSULE | Refills: 0 | Status: SHIPPED | OUTPATIENT
Start: 2024-05-29 | End: 2024-06-06 | Stop reason: SDUPTHER

## 2024-05-29 RX ORDER — FENTANYL CITRATE/PF 50 MCG/ML
25 SYRINGE (ML) INJECTION
Status: COMPLETED | OUTPATIENT
Start: 2024-05-29 | End: 2024-05-29

## 2024-05-29 RX ORDER — ONDANSETRON 2 MG/ML
4 INJECTION INTRAMUSCULAR; INTRAVENOUS EVERY 6 HOURS PRN
Status: CANCELLED | OUTPATIENT
Start: 2024-05-29

## 2024-05-29 RX ORDER — ONDANSETRON 2 MG/ML
4 INJECTION INTRAMUSCULAR; INTRAVENOUS ONCE AS NEEDED
Status: DISCONTINUED | OUTPATIENT
Start: 2024-05-29 | End: 2024-05-29 | Stop reason: HOSPADM

## 2024-05-29 RX ORDER — ACETAMINOPHEN 500 MG
1000 TABLET ORAL EVERY 8 HOURS
Qty: 60 TABLET | Refills: 0 | Status: SHIPPED | OUTPATIENT
Start: 2024-05-29 | End: 2024-06-08

## 2024-05-29 RX ORDER — FENTANYL CITRATE 50 UG/ML
INJECTION, SOLUTION INTRAMUSCULAR; INTRAVENOUS AS NEEDED
Status: DISCONTINUED | OUTPATIENT
Start: 2024-05-29 | End: 2024-05-29

## 2024-05-29 RX ORDER — CHLORHEXIDINE GLUCONATE ORAL RINSE 1.2 MG/ML
15 SOLUTION DENTAL ONCE
Status: COMPLETED | OUTPATIENT
Start: 2024-05-29 | End: 2024-05-29

## 2024-05-29 RX ORDER — MAGNESIUM HYDROXIDE 1200 MG/15ML
LIQUID ORAL AS NEEDED
Status: DISCONTINUED | OUTPATIENT
Start: 2024-05-29 | End: 2024-05-29 | Stop reason: HOSPADM

## 2024-05-29 RX ORDER — ACETAMINOPHEN 325 MG/1
975 TABLET ORAL ONCE
Status: COMPLETED | OUTPATIENT
Start: 2024-05-29 | End: 2024-05-29

## 2024-05-29 RX ORDER — OXYCODONE HYDROCHLORIDE 10 MG/1
10 TABLET ORAL EVERY 4 HOURS PRN
Status: DISCONTINUED | OUTPATIENT
Start: 2024-05-29 | End: 2024-05-29 | Stop reason: HOSPADM

## 2024-05-29 RX ORDER — ONDANSETRON 2 MG/ML
INJECTION INTRAMUSCULAR; INTRAVENOUS AS NEEDED
Status: DISCONTINUED | OUTPATIENT
Start: 2024-05-29 | End: 2024-05-29

## 2024-05-29 RX ORDER — OXYCODONE HYDROCHLORIDE 5 MG/1
5 TABLET ORAL EVERY 4 HOURS PRN
Status: DISCONTINUED | OUTPATIENT
Start: 2024-05-29 | End: 2024-05-29 | Stop reason: HOSPADM

## 2024-05-29 RX ORDER — PROPOFOL 10 MG/ML
INJECTION, EMULSION INTRAVENOUS AS NEEDED
Status: DISCONTINUED | OUTPATIENT
Start: 2024-05-29 | End: 2024-05-29

## 2024-05-29 RX ORDER — CEFAZOLIN SODIUM 2 G/50ML
2000 SOLUTION INTRAVENOUS EVERY 8 HOURS
Status: CANCELLED | OUTPATIENT
Start: 2024-05-29 | End: 2024-05-30

## 2024-05-29 RX ORDER — MIDAZOLAM HYDROCHLORIDE 2 MG/2ML
INJECTION, SOLUTION INTRAMUSCULAR; INTRAVENOUS AS NEEDED
Status: DISCONTINUED | OUTPATIENT
Start: 2024-05-29 | End: 2024-05-29

## 2024-05-29 RX ORDER — CALCIUM CARBONATE 500 MG/1
1000 TABLET, CHEWABLE ORAL DAILY PRN
Status: CANCELLED | OUTPATIENT
Start: 2024-05-29

## 2024-05-29 RX ORDER — MEPERIDINE HYDROCHLORIDE 25 MG/ML
12.5 INJECTION INTRAMUSCULAR; INTRAVENOUS; SUBCUTANEOUS
Status: DISCONTINUED | OUTPATIENT
Start: 2024-05-29 | End: 2024-05-29 | Stop reason: HOSPADM

## 2024-05-29 RX ORDER — SENNOSIDES 8.6 MG
1 TABLET ORAL DAILY
Status: CANCELLED | OUTPATIENT
Start: 2024-05-29

## 2024-05-29 RX ORDER — DEXAMETHASONE SODIUM PHOSPHATE 10 MG/ML
INJECTION, SOLUTION INTRAMUSCULAR; INTRAVENOUS AS NEEDED
Status: DISCONTINUED | OUTPATIENT
Start: 2024-05-29 | End: 2024-05-29

## 2024-05-29 RX ORDER — LIDOCAINE HYDROCHLORIDE 10 MG/ML
INJECTION, SOLUTION EPIDURAL; INFILTRATION; INTRACAUDAL; PERINEURAL AS NEEDED
Status: DISCONTINUED | OUTPATIENT
Start: 2024-05-29 | End: 2024-05-29

## 2024-05-29 RX ORDER — HYDROMORPHONE HCL/PF 1 MG/ML
0.5 SYRINGE (ML) INJECTION
Status: DISCONTINUED | OUTPATIENT
Start: 2024-05-29 | End: 2024-05-29 | Stop reason: HOSPADM

## 2024-05-29 RX ORDER — SODIUM CHLORIDE, SODIUM LACTATE, POTASSIUM CHLORIDE, CALCIUM CHLORIDE 600; 310; 30; 20 MG/100ML; MG/100ML; MG/100ML; MG/100ML
125 INJECTION, SOLUTION INTRAVENOUS CONTINUOUS
Status: DISCONTINUED | OUTPATIENT
Start: 2024-05-29 | End: 2024-05-29 | Stop reason: HOSPADM

## 2024-05-29 RX ORDER — ACETAMINOPHEN 325 MG/1
975 TABLET ORAL EVERY 8 HOURS
Status: DISCONTINUED | OUTPATIENT
Start: 2024-05-29 | End: 2024-05-29 | Stop reason: HOSPADM

## 2024-05-29 RX ORDER — HYDROMORPHONE HCL/PF 1 MG/ML
SYRINGE (ML) INJECTION AS NEEDED
Status: DISCONTINUED | OUTPATIENT
Start: 2024-05-29 | End: 2024-05-29

## 2024-05-29 RX ORDER — NAPROXEN 375 MG/1
375 TABLET ORAL 2 TIMES DAILY WITH MEALS
Qty: 28 TABLET | Refills: 0 | Status: SHIPPED | OUTPATIENT
Start: 2024-05-29 | End: 2024-06-12

## 2024-05-29 RX ORDER — SENNA AND DOCUSATE SODIUM 50; 8.6 MG/1; MG/1
1 TABLET, FILM COATED ORAL DAILY
Qty: 14 TABLET | Refills: 0 | Status: SHIPPED | OUTPATIENT
Start: 2024-05-29 | End: 2024-06-04 | Stop reason: ALTCHOICE

## 2024-05-29 RX ORDER — HYDROMORPHONE HCL/PF 1 MG/ML
0.5 SYRINGE (ML) INJECTION EVERY 2 HOUR PRN
Status: DISCONTINUED | OUTPATIENT
Start: 2024-05-29 | End: 2024-05-29 | Stop reason: HOSPADM

## 2024-05-29 RX ORDER — CHLORHEXIDINE GLUCONATE 40 MG/ML
SOLUTION TOPICAL DAILY PRN
Status: DISCONTINUED | OUTPATIENT
Start: 2024-05-29 | End: 2024-05-29 | Stop reason: HOSPADM

## 2024-05-29 RX ORDER — ONDANSETRON 4 MG/1
4 TABLET, ORALLY DISINTEGRATING ORAL EVERY 6 HOURS PRN
Qty: 15 TABLET | Refills: 0 | Status: SHIPPED | OUTPATIENT
Start: 2024-05-29

## 2024-05-29 RX ORDER — CEFAZOLIN SODIUM 2 G/50ML
2000 SOLUTION INTRAVENOUS ONCE
Status: COMPLETED | OUTPATIENT
Start: 2024-05-29 | End: 2024-05-29

## 2024-05-29 RX ADMIN — PROPOFOL 50 MCG/KG/MIN: 10 INJECTION, EMULSION INTRAVENOUS at 10:06

## 2024-05-29 RX ADMIN — DEXMEDETOMIDINE 4 MCG: 100 INJECTION, SOLUTION, CONCENTRATE INTRAVENOUS at 09:45

## 2024-05-29 RX ADMIN — FENTANYL CITRATE 25 MCG: 50 INJECTION INTRAMUSCULAR; INTRAVENOUS at 12:13

## 2024-05-29 RX ADMIN — FENTANYL CITRATE 50 MCG: 50 INJECTION INTRAMUSCULAR; INTRAVENOUS at 07:37

## 2024-05-29 RX ADMIN — SODIUM CHLORIDE, SODIUM LACTATE, POTASSIUM CHLORIDE, AND CALCIUM CHLORIDE 125 ML/HR: .6; .31; .03; .02 INJECTION, SOLUTION INTRAVENOUS at 06:14

## 2024-05-29 RX ADMIN — LIDOCAINE HYDROCHLORIDE 100 MG: 20 INJECTION, SOLUTION EPIDURAL; INFILTRATION; INTRACAUDAL at 07:37

## 2024-05-29 RX ADMIN — DEXAMETHASONE SODIUM PHOSPHATE 10 MG: 10 INJECTION INTRAMUSCULAR; INTRAVENOUS at 07:45

## 2024-05-29 RX ADMIN — CEFAZOLIN SODIUM 2000 MG: 2 SOLUTION INTRAVENOUS at 07:30

## 2024-05-29 RX ADMIN — HYDROMORPHONE HYDROCHLORIDE 0.5 MG: 1 INJECTION, SOLUTION INTRAMUSCULAR; INTRAVENOUS; SUBCUTANEOUS at 08:42

## 2024-05-29 RX ADMIN — ACETAMINOPHEN 975 MG: 325 TABLET, FILM COATED ORAL at 05:57

## 2024-05-29 RX ADMIN — PROPOFOL 50 MG: 10 INJECTION, EMULSION INTRAVENOUS at 08:48

## 2024-05-29 RX ADMIN — FENTANYL CITRATE 25 MCG: 50 INJECTION INTRAMUSCULAR; INTRAVENOUS at 09:14

## 2024-05-29 RX ADMIN — OXYCODONE HYDROCHLORIDE 10 MG: 10 TABLET ORAL at 13:00

## 2024-05-29 RX ADMIN — HYDROMORPHONE HYDROCHLORIDE 0.5 MG: 0.5 INJECTION, SOLUTION INTRAMUSCULAR; INTRAVENOUS; SUBCUTANEOUS at 12:33

## 2024-05-29 RX ADMIN — FENTANYL CITRATE 25 MCG: 50 INJECTION INTRAMUSCULAR; INTRAVENOUS at 12:18

## 2024-05-29 RX ADMIN — CHLORHEXIDINE GLUCONATE 15 ML: 1.2 RINSE ORAL at 06:13

## 2024-05-29 RX ADMIN — ACETAMINOPHEN 975 MG: 325 TABLET, FILM COATED ORAL at 16:15

## 2024-05-29 RX ADMIN — FENTANYL CITRATE 25 MCG: 50 INJECTION INTRAMUSCULAR; INTRAVENOUS at 11:54

## 2024-05-29 RX ADMIN — HYDROMORPHONE HYDROCHLORIDE 0.5 MG: 1 INJECTION, SOLUTION INTRAMUSCULAR; INTRAVENOUS; SUBCUTANEOUS at 14:55

## 2024-05-29 RX ADMIN — SODIUM CHLORIDE, SODIUM LACTATE, POTASSIUM CHLORIDE, AND CALCIUM CHLORIDE 125 ML/HR: .6; .31; .03; .02 INJECTION, SOLUTION INTRAVENOUS at 14:54

## 2024-05-29 RX ADMIN — SODIUM CHLORIDE, SODIUM LACTATE, POTASSIUM CHLORIDE, AND CALCIUM CHLORIDE: .6; .31; .03; .02 INJECTION, SOLUTION INTRAVENOUS at 10:10

## 2024-05-29 RX ADMIN — PROPOFOL 300 MG: 10 INJECTION, EMULSION INTRAVENOUS at 07:37

## 2024-05-29 RX ADMIN — OXYCODONE HYDROCHLORIDE 10 MG: 10 TABLET ORAL at 18:09

## 2024-05-29 RX ADMIN — FENTANYL CITRATE 25 MCG: 50 INJECTION INTRAMUSCULAR; INTRAVENOUS at 09:57

## 2024-05-29 RX ADMIN — ONDANSETRON 4 MG: 2 INJECTION INTRAMUSCULAR; INTRAVENOUS at 10:11

## 2024-05-29 RX ADMIN — FENTANYL CITRATE 50 MCG: 50 INJECTION INTRAMUSCULAR; INTRAVENOUS at 08:51

## 2024-05-29 RX ADMIN — DEXMEDETOMIDINE 8 MCG: 100 INJECTION, SOLUTION, CONCENTRATE INTRAVENOUS at 09:20

## 2024-05-29 RX ADMIN — MIDAZOLAM 2 MG: 1 INJECTION INTRAMUSCULAR; INTRAVENOUS at 07:32

## 2024-05-29 RX ADMIN — FENTANYL CITRATE 25 MCG: 50 INJECTION INTRAMUSCULAR; INTRAVENOUS at 12:23

## 2024-05-29 RX ADMIN — FENTANYL CITRATE 50 MCG: 50 INJECTION INTRAMUSCULAR; INTRAVENOUS at 07:41

## 2024-05-29 NOTE — ANESTHESIA POSTPROCEDURE EVALUATION
Post-Op Assessment Note    CV Status:  Stable  Pain Score: 0    Pain management: adequate       Mental Status:  Sleepy and arousable   Hydration Status:  Euvolemic   PONV Controlled:  Controlled   Airway Patency:  Patent     Post Op Vitals Reviewed: Yes    No anethesia notable event occurred.    Staff: Anesthesiologist, CRNA   Comments: Report given to recovering RN, VSS, See chart for vitals. Pt resting comfortably.              BP      Temp (P) 99.7 °F (37.6 °C) (05/29/24 1123)    Pulse     Resp      SpO2

## 2024-05-29 NOTE — DISCHARGE INSTR - AVS FIRST PAGE
Discharge Instructions  Dr. Lazaro Martinez, DO, Orthopedic Surgeon  Orthopedic Oncology & Sarcoma Surgery   [facility]  [phone]     What to Expect/Activity  It is normal to have some discomfort  at the surgical site for several days to weeks.  Weight bearing status: TOE TOUCH WEIGHT BEARING to left lower extremity  Please use crutches/walker when ambulating until your follow-up  Swelling and discomfort in the area is normal for several days after surgery. For the first 2-3 days, use ice to help. Use for 20-30 minutes every 1-2 hours for 48 hours, while awake. You may continue beyond 48 hours as needed.  RANGE OF MOTION: LIMITED- keep leg with slight bend but straight (there is a bandaged bump in the back of your knee immobilizer to help maintain the slight bend)  IMMOBILIZATION: unstrap or remove knee immobilizer only for icing knee    Dressing/Wound Care/Bathing  There will be a surgical dressing over your incision that stays in place until follow up.   ACE wrap may be present beyond incision. This can be readjusted as necessary for compression  You may shower, but must cover the dressing with a taped plastic bag or other waterproof barrier until follow up appointment   Do not place any creams, ointments or gels on or around the dressing  No baths, swimming or submerging until cleared by Dr. Martinez    Pain Management/Medications  You may resume your usual medications.  Please take the following medications:  Anti-coagulation (blood clot prevention)- Aspirin 81 mg, 1 tablet twice daily for 2 weeks  Pain medication:  Narcotic: Take as directed  NSAID/Anti-inflammatory: Take as directed  Tylenol : take as directed  Zofran (ondasetron) - 4mg every 8 hours as needed for nausea  Stool softeners (senna/colace) - take daily to prevent constipation as narcotic pain medication causes constipation  Antibiotic - take as directed if prescribed   If you have questions or pain concerns, please  contact the office.   Pain medication cannot remove all post-operative pain.  Cold Therapy:  The cold therapy device may be used either continuously or only as needed, according to your preference.  Do not let the pad directly touch your skin.  Alternatively, apply ice (20 min on, 20 min off) as often as you feel is necessary.  Elevation:  Elevate the entire leg above heart level.  Place pillows under your extremity to assist  Compression:  Apply ACE wraps or compression stockings as needed.    Drain Care  Refer to drain care instructions attached  Strip the drain occasionally  Keep a journal of daily output from the drain, and bring to your follow up appointment  Call to make a drain follow up appointment 1 week post op for evaluation or removal if needed  Continue taking antibiotics: duricef  until the drain is removed    Follow up/Call if:  The findings of your surgery will be explained to you and your family immediately after surgery. However, in the post-operative period, during recovery from anesthesia you may not fully remember or fully understand what was said. This will be again gone over when you return for your post-op appointment.  Please contact Dr. Martinez's office if you experience the following:  Excessive bleeding (bleeding through your dressing)  Fever greater than 101 degrees F after 48 hours (low grade fevers the day or two after surgery are normal)  Persistent nausea or vomiting  Decreased sensation or discoloration of the operative limb  Pain or swelling that is getting worse and not better with medication  Shortness of breath, chest pain, or other concerning symptoms      FOLLOW-UP APPOINTMENT:  7-10 days after surgery for drain check  10-14 days for incision check    Office Contact: [phone]      Devin-Lilly Drain Care       WHAT YOU NEED TO KNOW:   A Devin-Lilly (FERNANDA) drain is used to remove fluids that build up in an area of your body after surgery. The FERNANDA drain is a bulb shaped  device connected to a tube. One end of the tube is placed inside you during surgery. The other end comes out through a small cut in your skin. The bulb is connected to this end. You may have a stitch to hold the tube in place.   DISCHARGE INSTRUCTIONS:   Seek care immediately if:   Your FERNANDA drain breaks or comes out.     You have cloudy yellow or brown drainage from your FERNANDA drain site, or the drainage smells bad.    Contact your healthcare provider if:   You drain less than 30 milliliters (2 tablespoons) in 24 hours. This may mean your drain can be removed.    You suddenly stop draining fluid or think your FERNANDA drain is blocked.    You have a fever higher than 101.5°F (38.6°C).    You have increased pain, redness, or swelling around the drain site.     You have questions about your FERNANDA drain care.    How a Devin-Lilly drain works:  The FERNANDA drain removes fluids by creating suction in the tube. The bulb is squeezed flat and connected to the tube that sticks out of your body. The bulb expands as it fills with fluid.   How to change the bandage around your Devin-Lilly drain:  If you have a bandage, change it once a day. You may need to change your bandage more than once a day if it gets completely wet.  Wash your hands with soap and water.     Loosen the tape and gently remove the old bandage. Throw the old bandage into a plastic trash bag.    Use soap and water or saline (salt water) solution to clean your FERNANDA drain site. Dip a cotton swab or gauze pad in the solution and gently clean your skin.     Pat the area dry.     Place a new bandage on your FERNANDA drain site and secure it to your skin with medical tape.     Wash your hands.    How to empty the Devin-Lilly drain:  Empty the bulb when it is half full or every 8 to 12 hours.  Wash your hands with soap and water.     Remove the plug from the bulb.     Pour the fluid into a measuring cup.     Clean the plug with an alcohol swab or a cotton ball dipped in rubbing  alcohol.     Squeeze the bulb flat and put the plug back in. The bulb should stay flat until it starts to fill with fluid again.     Measure the amount of fluid you pour out. Write down how much fluid you empty from the FERNANDA drain and the date and time you collected it.    Flush the fluid down the toilet. Wash your hands.    Clear clogged tubing: Use the following steps to clear your Devin-Lilly tubing:  Hold the tubing between your thumb and first finger at the place closest to your skin. This hand will prevent the tube from being pulled out of your skin.     Use your other thumb and first finger to slide the clog down the tubing toward the bulb. You may have to repeat the sliding until the tubing is unclogged.    Devin-Lilly drain removal:  The amount of fluid that you drain will decrease as your wound heals. The FERNANDA drain usually is removed when less than 30 milliliters (2 tablespoons) is collected in 24 hours. Ask your healthcare provider when and how your FERNANDA drain will be removed.

## 2024-05-29 NOTE — OCCUPATIONAL THERAPY NOTE
Occupational Therapy Evaluation     Patient Name: Cheko EvansEspinal  Today's Date: 5/29/2024  Problem List  Principal Problem:    Osteochondroma of femur, left    Past Medical History  History reviewed. No pertinent past medical history.  Past Surgical History  History reviewed. No pertinent surgical history.        05/29/24 1520   OT Last Visit   OT Visit Date 05/29/24   Note Type   Note type Evaluation   Additional Comments greeted in supine and agreeable to skilled OT evaluation.   Pain Assessment   Pain Assessment Tool 0-10   Pain Score 6   Pain Location/Orientation Orientation: Left;Location: Knee;Location: Leg   Restrictions/Precautions   Weight Bearing Precautions Per Order Yes   LLE Weight Bearing Per Order TTWB   Braces or Orthoses Knee immobilizer  (LLE)   Other Precautions WBS;Multiple lines;Fall Risk;Pain   Home Living   Type of Home House   Additional Comments staying wtih family tonight then moving to [place]   Prior Function   Level of [place] Independent with ADLs;Independent with functional mobility;Independent with IADLS   Lives With Family   Receives Help From Family   IADLs Independent with driving;Independent with meal prep;Independent with medication management   Falls in the last 6 months 0   Vocational Full time employment   Comments Prior to admission, pt was I with all ADLs, IADLs and mobility with no device. Will be staying with aunt tonight and going to [place] to be with family. 0 falls. Works.   ADL   Where Assessed Edge of bed   Eating Assistance 7  Independent   Grooming Assistance 7  Independent   UB Bathing Assistance 6  Modified Independent   LB Bathing Assistance 5  Supervision/Setup   UB Dressing Assistance 6  Modified independent   LB Dressing Assistance 5  Supervision/Setup   Toileting Assistance  5  Supervision/Setup   Bed Mobility   Supine to Sit 4  Minimal assistance   Additional items Assist x 1;Increased time required;Verbal cues;LE management   Additional  Comments left seated up in chair following session. call light in reach.   Transfers   Sit to Stand 5  Supervision   Additional items Assist x 1;Increased time required;Verbal cues   Stand to Sit 5  Supervision   Additional items Assist x 1;Increased time required;Verbal cues   Toilet transfer 4  Minimal assistance   Additional items Assist x 1;Increased time required;Standard toilet   Functional Mobility   Functional Mobility 5  Supervision   Additional Comments household distances RW.   Additional items Rolling walker   Balance   Static Sitting Good   Dynamic Sitting Fair +   Static Standing Fair   Dynamic Standing Fair -   Ambulatory Fair -   Activity Tolerance   Activity Tolerance Patient tolerated treatment well;Treatment limited secondary to medical complications (Comment)   Medical Staff Made Aware PT Shell   Nurse Made Aware LISA STEPHENSON Assessment   RUE Assessment WFL   LUE Assessment   LUE Assessment WFL   Hand Function   Gross Motor Coordination Functional   Fine Motor Coordination Functional   Cognition   Overall Cognitive Status WFL   Arousal/Participation Cooperative   Attention Within functional limits   Orientation Level Oriented X4   Memory Within functional limits   Following Commands Follows all commands and directions without difficulty   Assessment   Limitation Decreased ADL status;Decreased Safe judgement during ADL;Decreased high-level ADLs;Decreased self-care trans   Prognosis Good   Assessment Sha NathanDave is a 19 y.o. male seen for OT evaluation s/p admit to [facility] on 5/29/2024 w/ Osteochondroma of femur, left. POD#0 s/p left knee large posterior osteochondroma excision with:  Dissection mobilization femoral and popliteal blood vessels, Neuroplasty sciatic nerve, Resection of 8cm by 8cm large posterior osteochondroma, and Rotation of sartorial distal muscle flap for defect coverage and blood vessel coverage. TTWB LLE. KI at all times. Comorbidities affecting pt's  functional performance at time of assessment include: obesity. Pt with active OT orders and activity orders for Up and OOB as tolerated. Personal factors affecting pt at time of IE include:WBS, fall risk , and functional decline . Prior to admission, pt was I with all ADLs, IADLs and mobility with no device. Will be staying with aunt tonight and going to [place] to be with family. 0 falls. Works. Upon evaluation: Pt currently requires Ashli for UB ADLs, supervision for LB ADLs, supervision for toileting, supervision for bed mobility, supervision for functional transfers, and supervision mobility 2* the following deficits impacting occupational performance:decreased strength , decreased balance, and decreased activity tolerance. VITALS during session: supine 150/73> seated post mobility 163/81.  Pt to benefit from continued skilled OT tx while in the hospital to address deficits as defined above and maximize level of functional independence w ADL's and functional mobility. Occupational Performance areas to address include: bathing/shower, toilet hygiene, dressing, functional mobility, and clothing management. From OT standpoint, recommendation at time of d/c would be level 3 resources. OT to follow pt on caseload 3-5x/wk.   Goals   Patient Goals to go home.   STG Time Frame 3-5   Short Term Goal #1 Pt will improve activity tolerance to G for min 30 min txment sessions for increase engagement in functional tasks   Short Term Goal #2 Pt will complete bed mobility at a Mod I level w/ G balance/safety demonstrated to decrease caregiver assistance required   Short Term Goal  Pt will complete LB dressing/self care w/ mod I using adaptive device and DME as needed   LTG Time Frame 10-14   Long Term Goal #1 Pt will complete toileting w/ mod I w/ G hygiene/thoroughness using DME as needed   Long Term Goal #2 Pt will improve functional transfers to Mod I on/off all surfaces using DME as needed w/ G balance/safety   Long Term Goal Pt  will improve functional mobility during ADL/IADL/leisure tasks to Mod I using DME as needed w/ G balance/safety   Plan   Treatment Interventions ADL retraining;Functional transfer training;UE strengthening/ROM;Endurance training;Patient/family training;Equipment evaluation/education;Compensatory technique education;Energy conservation;Activityengagement   Goal Expiration Date 06/21/24   OT Treatment Day 0   OT Frequency 3-5x/wk   Discharge Recommendation   Rehab Resource Intensity Level, OT III (Minimum Resource Intensity)   AM-PAC Daily Activity Inpatient   Lower Body Dressing 3   Bathing 3   Toileting 3   Upper Body Dressing 4   Grooming 4   Eating 4   Daily Activity Raw Score 21   Daily Activity Standardized Score (Calc for Raw Score >=11) 44.27   AM-PAC Applied Cognition Inpatient   Following a Speech/Presentation 4   Understanding Ordinary Conversation 4   Taking Medications 4   Remembering Where Things Are Placed or Put Away 4   Remembering List of 4-5 Errands 4   Taking Care of Complicated Tasks 4   Applied Cognition Raw Score 24   Applied Cognition Standardized Score 62.21   Yolande Haines, OT

## 2024-05-29 NOTE — OP NOTE
OPERATIVE REPORT    PATIENT NAME: Cheko Parikh   :  2005  MRN: [id number]  Pt Location: [facility] OR ROOM 01    SURGERY DATE: 2024    SURGEON(S) and ROLE:  Primary: Lazaro Martinez DO  Assisting: Abiel Hightower PA-C    NOTE:  The presence of a physician assistant was necessary to help with patient positioning, surgical exposure, wound retraction, wound closure, and other key portions of the procedure.  No qualified resident was available for this case.      PREOPERATIVE DIAGNOSES:  Left knee  large posterior osteochondroma     POSTOPERATIVE DIAGNOSES:  Same    PROCEDURES:  Dissection mobilization femoral and popliteal blood vessels  Neuroplasty sciatic nerve  Resection of 8cm by 8cm large posterior osteochondroma  Rotation of sartorial distal muscle flap for defect coverage and blood vessel coverage      ANESTHESIA TYPE:  General LMA    ANESTHESIA STAFF:   Anesthesiologist: Leticia Gama DO  CRNA: Jb Muir CRNA    ESTIMATED BLOOD LOSS:  100 mL    TOURNIQUET TIME:  none    PERIOPERATIVE ANTIBIOTICS:  cefazolin, 2 grams    IMPLANTS:  none    * No implants in log *    SPECIMENS:    ID Type Source Tests Collected by Time Destination   1 : LEFT femur tumor Tissue Leg, Left TISSUE EXAM Lazaro Martinez DO 2024 0949        DRAINS:  Hemovac (x1)      OPERATIVE INDICATIONS:  The patient is a 19 y.o. male with very large posterior distal femur osteochondroma that is displacing his blood vessels and causing him significant discomfort and pain.  Surgical treatment was indicated due to persistent symptoms despite non-surgical treatment and liklihood of prolonged or permanent functional impairment with non-surgical treatment.  MRI imaging demonstrated a cartilage cap of 1.1 cm, still less than 2 cm.  As well as cortical medullary continuity, keeping alignment is likely being a benign osteochondroma.  After a thorough discussion of the potential risks, benefits, and alternative treatments, the  patient agreed to proceed with surgery.  The patient understands that the risks of surgery include, but are not limited to: infection, neurovascular injury, wound healing complications, venous thromboembolism, persistent pain, stiffness, instability, recurrence of symptoms, potential need for additional surgeries, and loss of limb or life, malignant diagnosis.  Oral and written consent for surgery was obtained from the patient preoperatively.    PROCEDURE AND TECHNIQUE:  On the day of surgery, the patient was identified by myself in the preoperative holding area.  The operative site was marked by the surgeon as the proper operative extremity.  they were taken to the operating room ,transferred operating room table, placed in the supine position with all bony prominences well-padded.    The patient was anesthetized, intubated and sedated in the standard manner and perioperative antibiotics, ancef, were administered.  The patient was positioned supine on the OR table.  A tourniquet was not used.  All bony prominences were well-padded.  The operative site was prepped and draped using standard sterile technique.  A time-out was conducted to confirm the patient's identity, identifying all team members in the room, the operative site, and the proposed procedure.     Approximately 20 cm incision was made over the medial thigh crossing the knee to the proximal tibia after injecting lidocaine with epinephrine and the surgical site.  Skin was sharply incised down to the subcutaneous fat.  Bovie cautery was used to continue dissection down to the fascia.  Retractors were placed.  Sartorial fascia was identified and opened up with a Metzenbaum scissors proximally and distally.  Sartorius was mobilized posterior medially.  This allowed visualization and palpation of the femoral blood vessels.  The large posterior mass was palpable and the pulse was palpable superficially compared to the mass.  Some small perforating vessels  coming from the main blood vessels were innervating and feeding the sartorius distally, these were ligated in order to increase mobility away from the mass.  Femoral the blood vessels were found coming out of the adductor hiatus.  The abductor tendon was resected off the medial condyle in order to more easily identify the vessels and dissect them out.  Full dissection was performed with the femoral blood vessels both proximally into the distal popliteal crease where the date moved deeply around the mass.  Vesseloops were placed around the superficial femoral vein and superficial femoral artery.  Ligation of any lateral and anterior genicular vessels was then performed in order to mobilize the vessels posteriorly away from the mass.  This was done with small medium hemoclips as well as suture ligatures.  this was performed into the popliteal  space, creating substantial room and safety of the blood vessels away from their investment in the large posterior mass.  This point sciatic nerve was then identified laterally as it comes down the hamstring musculature.  Sciatic nerve was identified and dissected out cleanly away from any adhesions of the mass.  Vessel loop was placed around the sciatic nerve.  At this point more the sartorius need to be released distally in order to enter into the popliteal space to identify the entire mass.    Dissection meticulously was performed surrounding the mass after safely mobilizing the blood vessels.  Retractors were placed around the distal neck and the proximal neck of the mass as well as malleable retractors deep overlying the blood vessels to protect them.  Osteotome was then used in order to create resection of the mass at the posterior cortical junction.  Complete resection was performed, a large 8 x 6 cm mass was then resected safely from the posterior distal femur.  Vessels were palpated and pulse was intact distally beyond the area of the resection.  Using the osteotome in  this matter, a portion of the lateral osteochondroma was left behind, this was then resected with a smaller osteotome is 1 large 2 cm x 2 cm piece as well.  These were all taken on the back table and measured.  Meticulous hemostasis was obtained.  No overt bleeding was identified, and there was a visible and palpable pulse distal to the area of resection.  Palpation was performed to demonstrate no large amount of residual tumor left behind in the popliteal space.  Pulse lavage normal saline was then used to irrigate the wound deeply.  Bone wax was then placed on the posterior cortical defect in order to decrease bleeding.  Fibrillar was placed deeply within the wound overlying the blood vessels and over the small perforating vessels.    At this point, there is a large left over dead space for the resection of tumor and mobilization of the vessels.  15 Turkmen Beni drain was placed deeply within the wound into the area of blood vessels.  Sartorius was then mobilized distally and then rotated up medially and anteriorly in order to cover the blood vessels and to fill the defect of the large resection.  This was sutured to the adductor musculature with a #1 Vicryl.  This had full coverage and covered the blood vessels well and decrease the size of the defect.    Wound was irrigated with normal saline, cocktail injection was then placed within the deep and subcutaneous tissues.   Deep tissue was #1 Vicryl. Subcutaneous tissue was closed with 2-0 Monocryl.  Skin was closed with running 3-0 Monocryl and skin glue.  Skin was cleansed and dried, Mepilex dressing was placed.  Patient was wrapped in soft roll and Ace and a knee immobilizer was placed.  Patient was awoken from anesthesia without complication sent to the PACU.     I was present for the entire procedure., A qualified resident physician was not available. and A physician assistant was required during the procedure for retraction, tissue handling, dissection and  suturing.      COMPLICATIONS:  None    PATIENT DISPOSITION:  PACU  and hemodynamically stable    Plan:  TTWB LLE  ROM: knee immobilzer  PT/OT  DVT PPX: ASA 81mg BID x 2 weeks  Mepilex to remain in place until follow up in 10-14 days  Drain: 15 french black  May readjust ACE wrap as needed  Ancef x24 hours, duricef x 5 days  Multimodal pain control    SIGNATURE:  Lazaro Martinez,   DATE:  May 29, 2024  TIME:  10:57 AM

## 2024-05-29 NOTE — QUICK NOTE
Orthopedic Oncology Quick Note    Cheko is a 19 y.o. male now POD#0 s/p left knee large posterior osteochondroma excision with:  Dissection mobilization femoral and popliteal blood vessels, Neuroplasty sciatic nerve, Resection of 8cm by 8cm large posterior osteochondroma, and Rotation of sartorial distal muscle flap for defect coverage and blood vessel coverage   with Dr. Martinez on 5/29/24    Plan:  TTWB LLE  ROM: knee immobilzer with ABD packing for 5 degrees of flexion for wound  PT/OT  Crutches ordered, CM consulted for DME prn  DVT PPX: ASA 81mg BID x 2 weeks  Mepilex to remain in place until follow up in 10-14 days  Drain: 15 french rodolfo to bulb suction  May readjust ACE wrap as needed  Ancef x24 hours, duricef x 7 days until drain check  Multimodal pain control    Dispo: may be discharged once cleared by PT.     Inpatient order placed in error; may be outpatient no charge bed class until cleared for discharge    Abiel Hightower PA-C

## 2024-05-29 NOTE — PLAN OF CARE
Problem: OCCUPATIONAL THERAPY ADULT  Goal: Performs self-care activities at highest level of function for planned discharge setting.  See evaluation for individualized goals.  Description: Treatment Interventions: ADL retraining, Functional transfer training, UE strengthening/ROM, Endurance training, Patient/family training, Equipment evaluation/education, Compensatory technique education, Energy conservation, Activityengagement          See flowsheet documentation for full assessment, interventions and recommendations.   Note: Limitation: Decreased ADL status, Decreased Safe judgement during ADL, Decreased high-level ADLs, Decreased self-care trans  Prognosis: Good  Assessment: Cheko Parikh is a 19 y.o. male seen for OT evaluation s/p admit to [facility] on 5/29/2024 w/ Osteochondroma of femur, left. POD#0 s/p left knee large posterior osteochondroma excision with:  Dissection mobilization femoral and popliteal blood vessels, Neuroplasty sciatic nerve, Resection of 8cm by 8cm large posterior osteochondroma, and Rotation of sartorial distal muscle flap for defect coverage and blood vessel coverage. TTWB LLE. KI at all times. Comorbidities affecting pt's functional performance at time of assessment include: obesity. Pt with active OT orders and activity orders for Up and OOB as tolerated. Personal factors affecting pt at time of IE include:WBS, fall risk , and functional decline . Prior to admission, pt was I with all ADLs, IADLs and mobility with no device. Will be staying with aunt tonight and going to [place] to be with family. 0 falls. Works. Upon evaluation: Pt currently requires Ashli for UB ADLs, supervision for LB ADLs, supervision for toileting, supervision for bed mobility, supervision for functional transfers, and supervision mobility 2* the following deficits impacting occupational performance:decreased strength , decreased balance, and decreased activity tolerance. VITALS during session: supine  150/73> seated post mobility 163/81.  Pt to benefit from continued skilled OT tx while in the hospital to address deficits as defined above and maximize level of functional independence w ADL's and functional mobility. Occupational Performance areas to address include: bathing/shower, toilet hygiene, dressing, functional mobility, and clothing management. From OT standpoint, recommendation at time of d/c would be level 3 resources. OT to follow pt on caseload 3-5x/wk.     Rehab Resource Intensity Level, OT: III (Minimum Resource Intensity)

## 2024-05-29 NOTE — ANESTHESIA PREPROCEDURE EVALUATION
Procedure:  EXCISION BIOPSY BONE LESION EXTREMITY removal of massive osteochondroma left posterior knee w/ dissection & mobilization vessels (Left: Leg Lower)    Relevant Problems   ANESTHESIA (within normal limits)      CARDIO (within normal limits)      ENDO (within normal limits)      GI/HEPATIC (within normal limits)      /RENAL (within normal limits)      GYN (within normal limits)      HEMATOLOGY (within normal limits)      MUSCULOSKELETAL (within normal limits)      NEURO/PSYCH (within normal limits)      PULMONARY (within normal limits)      Rheumatology   (+) Internal derangement of left knee        Physical Exam    Airway    Mallampati score: III  TM Distance: >3 FB  Neck ROM: full     Dental   No notable dental hx     Cardiovascular  Rhythm: regular, Rate: normal, Cardiovascular exam normal    Pulmonary  Pulmonary exam normal Breath sounds clear to auscultation    Other Findings        Anesthesia Plan  ASA Score- 2     Anesthesia Type- general with ASA Monitors.         Additional Monitors:     Airway Plan: ETT.           Plan Factors-Exercise tolerance (METS): >4 METS.    Chart reviewed.   Existing labs reviewed. Patient summary reviewed.    Patient is a current smoker (Marijuana 4-5 x daily).              Induction- intravenous.    Postoperative Plan- Plan for postoperative opioid use.         Informed Consent- Anesthetic plan and risks discussed with patient.

## 2024-05-29 NOTE — PHYSICAL THERAPY NOTE
PHYSICAL THERAPY EVALUATION          Patient Name: Cheko EvansEspinal  Today's Date: 5/29/2024 05/29/24 1535   PT Last Visit   PT Visit Date 05/29/24   Note Type   Note type Evaluation   Pain Assessment   Pain Assessment Tool 0-10   Pain Score No Pain  (at rest)   Pain Location/Orientation Orientation: Left;Location: Leg   Effect of Pain on Daily Activities 6/10 during mobility   Restrictions/Precautions   Weight Bearing Precautions Per Order Yes   LLE Weight Bearing Per Order TTWB   Braces or Orthoses Knee immobilizer  (LLE)   Other Precautions WBS;Multiple lines;Fall Risk;Pain   Home Living   Type of Home House   Additional Comments reports moving to [place] tomorrow. +steps. no DME   Prior Function   Level of [place] Independent with ADLs;Independent with functional mobility;Independent with IADLS   Lives With Family  (mother)   Receives Help From Family   IADLs Independent with driving;Independent with meal prep;Independent with medication management   Falls in the last 6 months 0   Vocational Full time employment   Comments indep without an AD. mom able to assist upon dc   General   Additional Pertinent History pt admitted 5/29/24 for osteochondroma of L femur now POD#0 left knee large posterior osteochondroma excision. TTWB LLE in KI per ortho.   Cognition   Overall Cognitive Status WFL   Arousal/Participation Cooperative   Memory Within functional limits   Following Commands Follows all commands and directions without difficulty   Bed Mobility   Supine to Sit 4  Minimal assistance   Additional items Assist x 1;HOB elevated;Increased time required;LE management  (to support LLE for comfort)   Transfers   Sit to Stand 5  Supervision   Additional items Assist x 1;Increased time required;Other;Verbal cues  (RW)   Stand to Sit 5  Supervision   Additional items Assist x 1;Increased time required;Verbal cues;Other  (RW)   Additional Comments cues for technique   Ambulation/Elevation    Gait pattern Antalgic;Excessively slow   Gait Assistance 5  Supervision   Additional items Assist x 1;Verbal cues  (cues for initial sequencing)   Assistive Device Rolling walker   Distance 35'   Balance   Static Standing Fair   Dynamic Standing Fair -   Ambulatory Fair -   Endurance Deficit   Endurance Deficit Yes   Endurance Deficit Description pre amb vitals 92, 98% on RA. after pt went to the bathroom reporting lightheadedness. unresolved w sitting rest break. vitals 163/81   Activity Tolerance   Activity Tolerance Patient tolerated treatment well;Treatment limited secondary to medical complications (Comment)  (dizziness)   Medical Staff Made Aware Mary OT   Nurse Made Aware Rosalee GONZALEZ   Assessment   Prognosis Good   Problem List Pain;Orthopedic restrictions;Decreased endurance   Assessment ShaGhulam Parikh is a 19 y.o. male admitted to [facility] on 5/29/2024 for Osteochondroma of femur, left. POD#0 L knee large posterior osteochondroma excision. PT was consulted and pt was seen on 5/29/2024 for mobility assessment and d/c planning. Pt presents w fall risk, multiple lines, wbs and post op bracing per ortho, acute pain. At baseline is indep. Pt is currently functioning at a min A level for bed mobility, S for transfers and ambulation w RW. Pt demonstrated ability to ambulate household distances. Demonstrated 100% compliance w TTWB, primarily mobilizing at NWB dt pain. Further mobility/ activity tolerance limited by onset of dizziness that did not improve w seated rest and RLE ankle pumps. Vitals stable despite ongoing sx; RN aware. Did verbally review stair negotiation technique w patient who verbalize understanding. Also reviewed bracing wear recommendations. Pt will benefit from continued skilled IP PT to address the above mentioned impairments  in order to maximize recovery and increase functional independence when completing mobility and ADLs. The patient's AM-PAC Basic Mobility Inpatient Short Form  Raw Score is 18. A Raw score of greater than 16 suggests the patient may benefit from discharge to home.   Barriers to Discharge None   Goals   STG Expiration Date 06/08/24   Short Term Goal #1 1).  Pt will perform bed mobility with Ashli demonstrating appropriate technique 100% of the time in order to improve function. 2) Perform all transfers with Ashli demonstrating safe and appropriate technique 100% of the time in order to improve ability to negotiate safely in home environment. 3) Amb with least restrictive AD > 150'x1 with mod I in order to demonstrate ability to negotiate in home environment. 4) Improve overall strength and balance 1/2 grade in order to optimize ability to perform functional tasks and reduce fall risk. 5) Increase activity tolerance to 45 minutes in order to improve endurance to functional tasks. 6)  Negotiate stairs using most appropriate technique and S in order to be able to negotiate safely in home environment. 7) PT for ongoing patient and family/caregiver education, DME needs and d/c planning in order to promote highest level of function in least restrictive environment.   Plan   Treatment/Interventions Functional transfer training;LE strengthening/ROM;Elevations;Therapeutic exercise;Patient/family training;Equipment eval/education;Bed mobility;Gait training;Compensatory technique education;Spoke to nursing;OT   PT Frequency 2-3x/wk   Discharge Recommendation   Rehab Resource Intensity Level, PT III (Minimum Resource Intensity)  (HHPT)   Equipment Recommended   (pt decline need for RW at this time. per chart order for crutches)   AM-PAC Basic Mobility Inpatient   Turning in Flat Bed Without Bedrails 3   Lying on Back to Sitting on Edge of Flat Bed Without Bedrails 3   Moving Bed to Chair 3   Standing Up From Chair Using Arms 3   Walk in Room 3   Climb 3-5 Stairs With Railing 3   Basic Mobility Inpatient Raw Score 18   Basic Mobility Standardized Score 41.05   [facility]  Of Mobility   JH-HLM Goal 6: Walk 10 steps or more   JH-HLM Achieved 7: Walk 25 feet or more   End of Consult   Patient Position at End of Consult Bedside chair;All needs within reach   History: co - morbidities, current experience including fall risk, multiple lines, wbs, post op bracing  Exam: impairments in systems including multiple body structures involved; neuromuscular (balance, gait, transfers), cardiopulmonary (vitals), am-pac  Clinical: unstable/unpredictable  Complexity:high      Shell Jeong, PT

## 2024-05-29 NOTE — PLAN OF CARE
Problem: PHYSICAL THERAPY ADULT  Goal: Performs mobility at highest level of function for planned discharge setting.  See evaluation for individualized goals.  Description: Treatment/Interventions: Functional transfer training, LE strengthening/ROM, Elevations, Therapeutic exercise, Patient/family training, Equipment eval/education, Bed mobility, Gait training, Compensatory technique education, Spoke to nursing, OT  Equipment Recommended:  (pt decline need for RW at this time. per chart order for crutches)       See flowsheet documentation for full assessment, interventions and recommendations.  Note: Prognosis: Good  Problem List: Pain, Orthopedic restrictions, Decreased endurance  Assessment: Cheko Parikh is a 19 y.o. male admitted to [facility] on 5/29/2024 for Osteochondroma of femur, left. POD#0 L knee large posterior osteochondroma excision. PT was consulted and pt was seen on 5/29/2024 for mobility assessment and d/c planning. Pt presents w fall risk, multiple lines, wbs and post op bracing per ortho, acute pain. At baseline is indep. Pt is currently functioning at a min A level for bed mobility, S for transfers and ambulation w RW. Pt demonstrated ability to ambulate household distances. Demonstrated 100% compliance w TTWB, primarily mobilizing at NWB dt pain. Further mobility/ activity tolerance limited by onset of dizziness that did not improve w seated rest and RLE ankle pumps. Vitals stable despite ongoing sx; RN aware. Did verbally review stair negotiation technique w patient who verbalize understanding. Also reviewed bracing wear recommendations. Pt will benefit from continued skilled IP PT to address the above mentioned impairments  in order to maximize recovery and increase functional independence when completing mobility and ADLs. The patient's AM-PAC Basic Mobility Inpatient Short Form Raw Score is 18. A Raw score of greater than 16 suggests the patient may benefit from discharge to  home.  Barriers to Discharge: None     Rehab Resource Intensity Level, PT: III (Minimum Resource Intensity) (HHPT)    See flowsheet documentation for full assessment.

## 2024-05-30 ENCOUNTER — TELEPHONE (OUTPATIENT)
Dept: OBGYN CLINIC | Facility: MEDICAL CENTER | Age: 19
End: 2024-05-30

## 2024-05-30 NOTE — TELEPHONE ENCOUNTER
Left message for Pt to please call the office. Dr. Martinez would like you to set up a post -op   Appointment on 6-6-2024.     When Pt returns call please set up a Post-Op appointment on 6-6-2024 with Dr. Martinez. Pt is to keep the 6- appointment as well.

## 2024-05-30 NOTE — UTILIZATION REVIEW
NOTIFICATION OF ADMISSION DISCHARGE   This is a Notification of Discharge from [facility]. Please be advised that this patient has been discharge from our facility. Below you will find the admission and discharge date and time including the patient’s disposition.   UTILIZATION REVIEW CONTACT:  Simona Degroot  Utilization   Network Utilization Review Department  Phone: [phone] x carefully listen to the prompts. All voicemails are confidential.  Email: [email or web address]     ADMISSION INFORMATION  PRESENTATION DATE: 5/29/2024  5:35 AM  OBERVATION ADMISSION DATE:   INPATIENT ADMISSION DATE: 5/29/24 11:33 AM   DISCHARGE DATE: 5/29/2024  6:43 PM   DISPOSITION:Home/Self Care    Network Utilization Review Department  ATTENTION: Please call with any questions or concerns to [phone] and carefully listen to the prompts so that you are directed to the right person. All voicemails are confidential.   For Discharge needs, contact Care Management DC Support Team at [phone] opt. 2  Send all requests for admission clinical reviews, approved or denied determinations and any other requests to dedicated fax number below belonging to the campus where the patient is receiving treatment. List of dedicated fax numbers for the Facilities:  FACILITY NAME UR FAX NUMBER   ADMISSION DENIALS (Administrative/Medical Necessity) [phone]   DISCHARGE SUPPORT TEAM ([facility]) [phone]   PARENT CHILD HEALTH (Maternity/NICU/Pediatrics) [phone]   [facility] [phone]   [facility] [phone]   [facility] [phone]   [facility] [phone]   [facility] [phone]   [facility] [phone]   [facility] [phone]   [facility] [phone]   [facility]  [facility] [phone]   Count includes the [facility] [phone]   [facility] [phone]   [facility] ([place], [place]) [phone]

## 2024-06-04 ENCOUNTER — OFFICE VISIT (OUTPATIENT)
Dept: FAMILY MEDICINE CLINIC | Facility: CLINIC | Age: 19
End: 2024-06-04
Payer: COMMERCIAL

## 2024-06-04 ENCOUNTER — HOSPITAL ENCOUNTER (OUTPATIENT)
Dept: NON INVASIVE DIAGNOSTICS | Facility: CLINIC | Age: 19
Discharge: HOME | End: 2024-06-04
Payer: COMMERCIAL

## 2024-06-04 VITALS
DIASTOLIC BLOOD PRESSURE: 85 MMHG | OXYGEN SATURATION: 98 % | TEMPERATURE: 96.3 F | HEART RATE: 101 BPM | SYSTOLIC BLOOD PRESSURE: 123 MMHG | RESPIRATION RATE: 17 BRPM | BODY MASS INDEX: 29.4 KG/M2 | WEIGHT: 221.8 LBS | HEIGHT: 73 IN

## 2024-06-04 DIAGNOSIS — K59.00 CONSTIPATION: ICD-10-CM

## 2024-06-04 DIAGNOSIS — D16.22 OSTEOCHONDROMA OF FEMUR, LEFT: ICD-10-CM

## 2024-06-04 DIAGNOSIS — Z11.4 SCREENING FOR HIV (HUMAN IMMUNODEFICIENCY VIRUS): ICD-10-CM

## 2024-06-04 DIAGNOSIS — Z76.89 ENCOUNTER TO ESTABLISH CARE: Primary | ICD-10-CM

## 2024-06-04 DIAGNOSIS — M79.662 TENDERNESS OF LEFT CALF: ICD-10-CM

## 2024-06-04 DIAGNOSIS — Z11.59 NEED FOR HEPATITIS C SCREENING TEST: ICD-10-CM

## 2024-06-04 DIAGNOSIS — R00.0 TACHYCARDIA: ICD-10-CM

## 2024-06-04 PROBLEM — R29.818 SUSPECTED SLEEP APNEA: Status: ACTIVE | Noted: 2024-06-04

## 2024-06-04 PROCEDURE — 93971 EXTREMITY STUDY: CPT

## 2024-06-04 PROCEDURE — 99204 OFFICE O/P NEW MOD 45 MIN: CPT | Performed by: PHYSICIAN ASSISTANT

## 2024-06-04 PROCEDURE — 93971 EXTREMITY STUDY: CPT | Performed by: SURGERY

## 2024-06-04 RX ORDER — POLYETHYLENE GLYCOL 3350 17 G/17G
17 POWDER, FOR SOLUTION ORAL DAILY
Qty: 51 G | Refills: 0 | Status: SHIPPED | OUTPATIENT
Start: 2024-06-04 | End: 2024-06-07
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
## 2024-06-05 ENCOUNTER — TELEPHONE (OUTPATIENT)
Dept: OBGYN CLINIC | Facility: MEDICAL CENTER | Age: 19
End: 2024-06-05

## 2024-06-06 ENCOUNTER — OFFICE VISIT (OUTPATIENT)
Dept: OBGYN CLINIC | Facility: MEDICAL CENTER | Age: 19
End: 2024-06-06

## 2024-06-06 VITALS
BODY MASS INDEX: 29.29 KG/M2 | HEART RATE: 92 BPM | DIASTOLIC BLOOD PRESSURE: 97 MMHG | WEIGHT: 221 LBS | SYSTOLIC BLOOD PRESSURE: 156 MMHG | HEIGHT: 73 IN

## 2024-06-06 DIAGNOSIS — Z47.89 AFTERCARE FOLLOWING SURGERY OF THE MUSCULOSKELETAL SYSTEM: ICD-10-CM

## 2024-06-06 DIAGNOSIS — D16.22 OSTEOCHONDROMA OF FEMUR, LEFT: ICD-10-CM

## 2024-06-06 PROCEDURE — 99024 POSTOP FOLLOW-UP VISIT: CPT | Performed by: STUDENT IN AN ORGANIZED HEALTH CARE EDUCATION/TRAINING PROGRAM

## 2024-06-06 RX ORDER — CEFADROXIL 500 MG/1
500 CAPSULE ORAL EVERY 12 HOURS SCHEDULED
Qty: 14 CAPSULE | Refills: 0 | Status: SHIPPED | OUTPATIENT
Start: 2024-06-06 | End: 2024-06-13

## 2024-06-06 NOTE — PROGRESS NOTES
"Orthopedic Oncology Post Operative Office Note  Cheko Parikh (19 y.o. male)   : 2005   MRN: [id number]   Encounter Date: 2024  Dr. Lazaro Martinez DO, Orthopedic Surgeon  Orthopedic Oncology & Sarcoma Surgery   DOS: 2024  Procedure performed: EXCISION BIOPSY BONE LESION EXTREMITY removal of massive osteochondroma left posterior knee w/ dissection & mobilization vessels - Left      Impression/Plan: 19 y.o. male with a history of osteochondroma of posterior distal femur 1 week  s/p EXCISION BIOPSY BONE LESION EXTREMITY removal of massive osteochondroma left posterior knee w/ dissection & mobilization vessels - Left      S/p excision osteochondroma  Wound Care   Continue current care.  Keep dressing c/d/i  Pain control: PRN  Continue ice packs/elevation  Can continue compression with ACE wrap or compression stockings  Physical therapy: Not indicated at this time  TTWB to LLE  Sling/Immobilizer/Brace: Knee immobilizer with bump for slight flexion  Complete DVT ppx ASA     No follow-ups on file.  for evaluation with x-ray    2. osteochondroma  - to be discussed, no current indication for further evaluation or follow up after postoperative period        Subjective:  19 y.o. male 1 week s/p EXCISION BIOPSY BONE LESION EXTREMITY removal of massive osteochondroma left posterior knee w/ dissection & mobilization vessels - Left  DOS: 2024     Pain/Complaints: N/A   Numbness/weakness extremity: anterior shin diminished sensation, improving over the past few days     Physical Therapy Progress: Not indicated at this time   DVT ppx: ASA 81mg BID x 2 weeks   Abx: continue duricef while drain in place   Eating/Drinking improving. Bowel/Bladder: WNL   No noted fever/chills, numbness/tingling, injury/trauma, night sweats    Physical Exam:  Height: 6' 1\" (185.4 cm)  Weight - Scale: 100 kg (221 lb)  BMI (Calculated): 29.2  BSA (Calculated - m2): 2.25 sq meters     Vitals:    24 1021   BP: " 156/97   Pulse: 92     Body mass index is 29.16 kg/m².    General: alert and oriented; well nourished/well developed; no apparent distress.    Drains:   Drain output: 20cc daily, drain to remain in for another week    Extremity: left leg  mild swelling throughout  Dressing: removed mepilex, replaced with 4x4 and tegaderms  Surgical site:  healing well, glue intact  Sensation: diminished over anterior shin, intact otherwise, improving    Motor: EHL/FHL/DF/PF  Brisk cap refill  Calf: bilateral calves soft, nontender      Imaging:     PO femur XR show surgical clips and drain, with interval absence of osteochondroma    Oncology History    No history exists.            Dr. Lazaro Martinez DO, Orthopedic Surgeon  Orthopedic Oncology & Sarcoma Surgery   Phone:[phone] Fax:[phone]

## 2024-06-13 ENCOUNTER — OFFICE VISIT (OUTPATIENT)
Dept: OBGYN CLINIC | Facility: MEDICAL CENTER | Age: 19
End: 2024-06-13

## 2024-06-13 VITALS
HEART RATE: 77 BPM | BODY MASS INDEX: 29.16 KG/M2 | SYSTOLIC BLOOD PRESSURE: 119 MMHG | HEIGHT: 73 IN | DIASTOLIC BLOOD PRESSURE: 79 MMHG

## 2024-06-13 DIAGNOSIS — D16.9 OSTEOCHONDROMA: ICD-10-CM

## 2024-06-13 DIAGNOSIS — D16.22 OSTEOCHONDROMA OF FEMUR, LEFT: Primary | ICD-10-CM

## 2024-06-13 DIAGNOSIS — M23.92 INTERNAL DERANGEMENT OF LEFT KNEE: ICD-10-CM

## 2024-06-13 PROCEDURE — 99024 POSTOP FOLLOW-UP VISIT: CPT | Performed by: STUDENT IN AN ORGANIZED HEALTH CARE EDUCATION/TRAINING PROGRAM

## 2024-06-13 NOTE — PROGRESS NOTES
"Orthopedic Oncology Post Operative Office Note  Cheko Parikh (19 y.o. male)   : 2005   MRN: [id number]   Encounter Date: 2024  Dr. Lazaro Martinez DO, Orthopedic Surgeon  [facility]   DOS: 2024  Procedure performed: EXCISION BIOPSY BONE LESION EXTREMITY removal of massive osteochondroma left posterior knee w/ dissection & mobilization vessels - Left      Impression/Plan: 19 y.o. male with a history of osteochondroma of posterior distal femur 2 weeks  s/p EXCISION BIOPSY BONE LESION EXTREMITY removal of massive osteochondroma left posterior knee w/ dissection & mobilization vessels - Left    S/p excision osteochondroma  Wound Care   OK to shower, glue will fall off on own  Pain control: PRN  Continue ice packs/elevation  Physical therapy: Not yet started- prescription provided  TTWB to LLE transitioning to WBAT with PT  Sling/Immobilizer/Brace: dc knee immobilizer  Complete DVT ppx ASA - completed    Return in about 4 weeks (around 2024).  for evaluation with x-ray    2. osteochondroma  - discussed current need to continue healing and progressing with PT, possible XR in the future       Subjective:  19 y.o. male 2 weeks s/p EXCISION BIOPSY BONE LESION EXTREMITY removal of massive osteochondroma left posterior knee w/ dissection & mobilization vessels - Left  DOS: 2024     Pain/Complaints: N/A   Numbness/weakness extremity: significant weakness    Physical Therapy Progress: Not indicated at this time   DVT ppx: ASA 81mg BID x 2 weeks   Abx: continue duricef while drain in place   Eating/Drinking improving. Bowel/Bladder: WNL   No noted fever/chills, numbness/tingling, injury/trauma, night sweats    Physical Exam:  Height: 6' 1\" (185.4 cm)  Weight - Scale:  (could not obtain)     Vitals:    24 1155   BP: 119/79   Pulse: 77     Body mass index is 29.16 kg/m².    General: alert and oriented; well nourished/well developed; no apparent " distress.    Drains:  Pulled today    Extremity: left leg  mild swelling throughout  Dressing: removed dressing without concern  Surgical site:  healing well, glue intact  Sensation: diminished over anterior shin, intact otherwise, improving    Motor: EHL/FHL/DF/PF  Brisk cap refill  Calf: bilateral calves soft, nontender    Imaging:   PO femur XR show surgical clips and drain, with interval absence of osteochondroma    Oncology History    No history exists.     Dr. Lazaro Martinez DO, Orthopedic Surgeon  [facility]   Phone:[phone] Fax:[phone]

## 2024-07-11 ENCOUNTER — APPOINTMENT (OUTPATIENT)
Dept: RADIOLOGY | Facility: MEDICAL CENTER | Age: 19
End: 2024-07-11

## 2024-07-11 ENCOUNTER — OFFICE VISIT (OUTPATIENT)
Dept: OBGYN CLINIC | Facility: MEDICAL CENTER | Age: 19
End: 2024-07-11

## 2024-07-11 VITALS
DIASTOLIC BLOOD PRESSURE: 86 MMHG | HEIGHT: 73 IN | HEART RATE: 68 BPM | SYSTOLIC BLOOD PRESSURE: 120 MMHG | BODY MASS INDEX: 29.29 KG/M2 | WEIGHT: 221 LBS

## 2024-07-11 DIAGNOSIS — Z47.89 AFTERCARE FOLLOWING SURGERY OF THE MUSCULOSKELETAL SYSTEM: ICD-10-CM

## 2024-07-11 DIAGNOSIS — D16.22 OSTEOCHONDROMA OF FEMUR, LEFT: Primary | ICD-10-CM

## 2024-07-11 PROCEDURE — 73552 X-RAY EXAM OF FEMUR 2/>: CPT

## 2024-07-11 PROCEDURE — 99024 POSTOP FOLLOW-UP VISIT: CPT | Performed by: STUDENT IN AN ORGANIZED HEALTH CARE EDUCATION/TRAINING PROGRAM

## 2024-07-11 NOTE — PATIENT INSTRUCTIONS
Home exercises     Quad sets:  Flatten out knee by tightening quad muscles.  Hold for 3 seconds.  10-15 reps  3 sets          Also perform above exercises with toes turned toward the outside.        Also perform above exercises with toes turned toward the outside.        Short arc quad sets:  Hold for 3 seconds at the top.  Perform 10-15 reps  3 sets           Glute bridges:  Hips up and squeeze buttocks  Advance with single leg glute bridge once glute bridges are easy.  Hold buttocks for 3-5 seconds at the top  Perform 10-15 reps  3 sets         Clam Shells  Lay on your side.  Perform without resistance at knees to begin.  Engage your core and lift your side off the floor.  Lift your knee in the air by using your glute muscles. Foot/ankle does not come off the other ankle.  3 x 10  Hold for 3 seconds at the top.  Progress to resisted clam shell with resiance band at knees       [place] Dead lifts   Start with a Proper Setup:  Stand with your feet hip-width apart and the barbell in front of you.  Hinge at the hips, keeping your back straight and shoulders over the bar.  Maintain a slight bend in your knees.  Hinge at the Hips:  Imagine pushing your hips back as if closing a car door with your butt.  Keep your back flat and engage your core.  Use an Overhand , Keep the Bar Close, and Lift:   the bar with an overhand  (palms facing you) just outside your thighs.  Keep the bar close to your body throughout the movement.  Lift by driving your feet through the floor and extending your hips.  Pause at the Top:  Stand up fully, squeezing your glutes at the top.  Maintain a neutral spine and avoid hyperextending your lower back.    3 x 10      Progress to single leg [place] Dead Lift   Stand on injured side with a soft bent knee   Perform without weight in the early phases and progress to a single dumbbell in the opposite hand.   3 x 10       Bird dogs   On all 4's lift opposite arm and opposite leg while  engaging core  3 x 10   Hold for 3 seconds at the top          Hamstring stretches  10 x 30 seconds  Apply overpressure with your hands to your quadriceps  Perform after you get out of the shower and after exercising or heating.       Heel slides  10 x 30 seconds  Place towel at heel and pull towards your bottom.   Focus on bending as far as you can and then straightening as much as possible.

## 2024-07-11 NOTE — PROGRESS NOTES
Orthopedic Oncology Post Operative Office Note  Cheko Parikh (19 y.o. male)   : 2005   MRN: [id number]   Encounter Date: 2024  Dr. Lazaro Martinez, , Orthopedic Surgeon  [facility]     Assessment, Plan, & Discussion:   Cheko Parikh is a 19 y.o. male:     DOS: 2024  Procedure performed: 6 weeks s/p EXCISION BIOPSY BONE LESION EXTREMITY removal of massive osteochondroma left posterior knee w/ dissection & mobilization vessels - Left      Impression/Plan: 19 y.o. male with a history of osteochondroma of posterior distal femur 2 weeks  s/p EXCISION BIOPSY BONE LESION EXTREMITY removal of massive osteochondroma left posterior knee w/ dissection & mobilization vessels - Left    S/p excision osteochondroma, final pathology confirmed   Patient's x-rays reviewed today.   Wound Care   Continue showering daily.  Discussed if sutures begin extruding to clip with a nail clipper or call the office.  Patient encouraged to perform scar massage to the incision   Pain control: PRN  Continue ice packs/elevation  Physical therapy: Not yet started- prescription provided. Patient instructed to ask the receptionists to print a paper copy for him to take with him.  Home exercises were placed in the patient's after visit summary while awaiting to begin physical therapy.   Exercises were demonstrated to the patient.   WBAT   Immobilizer/Brace: dc knee immobilizer  Walker: goal is to have this discontinued at next appointment  Complete DVT ppx ASA - completed    Return in about 6 weeks (around 2024) for f/u, L femur PO #4.  for evaluation    2. osteochondroma  - discussed current need to continue healing and progressing with PT, possible XR in the future       History of Present Illness:      Subjective:  19 y.o. male 6 weeks s/p EXCISION BIOPSY BONE LESION EXTREMITY removal of massive osteochondroma left posterior knee w/ dissection & mobilization vessels -  "Left  DOS: 5/29/2024     Pain/Complaints: N/A - no pain medication at this time  Numbness/weakness extremity: numbness and tingling into  anterolateral lower leg, moderate to significant weakness   Physical Therapy Progress: Not yet started- prescription provided at last visit. Wasn't printed for him. Patient moved near [place], [place]   DVT ppx: ASA 81mg BID x 2 weeks - completed   Abx: Completed Duricef at cessation of drain   Eating/Drinking improving. Bowel/Bladder: WNL   No noted fever/chills, numbness/tingling, injury/trauma, night sweats    Physical Examination:      Physical Exam:  Height: 6' 1\" (185.4 cm)  Weight - Scale: 100 kg (221 lb)  BMI (Calculated): 29.2  BSA (Calculated - m2): 2.25 sq meters     Vitals:    07/11/24 1146   BP: 120/86   Pulse: 68       Body mass index is 29.16 kg/m².    General: alert and oriented; well nourished/well developed; no apparent distress.    Drains:  Previously pulled.      Extremity: left leg   Dressing: N/A  Surgical site:  well healed without signs of erythema or infection. Small suture beginning to extrude at proximal incision   Alignment neutral  There is moderate effusion.    There is tenderness over the medial joint line, pes anserine.    Range of motion from 10 to 110.    There is no crepitus with range of motion.   There is 5/5 quadriceps strength and equal tone in comparison to contralateral.  There is 4/5 hamstring strength    The patient is able to perform a straight leg raise.    Motor: EHL/FHL/DF/PF  Brisk cap refill  Calf: bilateral calves soft, nontender  Sensation decreased to light tough at lateral knee and anterior shin  The patient is neurovascular intact distally.      Imaging Results:   Imaging:   Xrays of left femur taken 7/11/24 demonstrate surgical clips with interval absence of osteochondroma. Early degenerative changes present at the tibiofemoral joint space evidenced by flattening of tibia with early osteophyte formation.      PO left femur XR " 5/29/24 show surgical clips and drain, with interval absence of osteochondroma    Pathology & Pertinent Laboratory Findings:       Pertinent laboratory findings:  N/a     Pathology: Final result  5/29/24 Tissue Exam   Final Diagnosis   A.  Bone, left femur tumor, excision:  -Tumoral portion of benign bone with benign cartilaginous cap, compatible with osteochondroma, negative for features of malignancy.      Microbiology:  Cultures: N/a       Oncology History    No history exists.     Dr. Lazaro Martinez DO, Orthopedic Surgeon  [facility]   Phone:[phone] Fax:[phone]       Scribe Attestation    I,:  Lilia Maria am acting as a scribe while in the presence of the attending physician.:       I,:  Lazaro Martinez DO personally performed the services described in this documentation    as scribed in my presence.:

## 2025-01-13 ENCOUNTER — TELEPHONE (OUTPATIENT)
Dept: OBGYN CLINIC | Facility: MEDICAL CENTER | Age: 20
End: 2025-01-13

## 2025-01-13 NOTE — TELEPHONE ENCOUNTER
Patient called the RX Refill Line. Message is being forwarded to the office.     Patient is requesting a letter stating that he can go back to work. Patient is requesting it to be emailed to him at [email or web address] If it caanot be emailed please contact the patient    Please contact patient at [phone]

## 2025-07-31 ENCOUNTER — TELEPHONE (OUTPATIENT)
Dept: FAMILY MEDICINE CLINIC | Facility: CLINIC | Age: 20
End: 2025-07-31

## (undated) DEVICE — JACKSON-PRATT 100CC BULB RESERVOIR: Brand: CARDINAL HEALTH

## (undated) DEVICE — JP CHAN DRN SIL HUBLESS 15FR W/TRO: Brand: CARDINAL HEALTH

## (undated) DEVICE — BETHLEHEM TOTAL HIP, KIT: Brand: CARDINAL HEALTH

## (undated) DEVICE — VESSEL LOOP MAXI - RED

## (undated) DEVICE — BONE WAX WHITE: Brand: BONE WAX WHITE

## (undated) DEVICE — ADHESIVE SKIN HIGH VISCOSITY EXOFIN 1ML

## (undated) DEVICE — SURGICEL FIBRILLAR 1 X 2

## (undated) DEVICE — NEEDLE HYPO 22G X 1-1/2 IN

## (undated) DEVICE — IMMOBILIZER KNEE UNIVERSAL 19 IN

## (undated) DEVICE — ABDOMINAL PAD: Brand: DERMACEA

## (undated) DEVICE — INTENDED FOR TISSUE SEPARATION, AND OTHER PROCEDURES THAT REQUIRE A SHARP SURGICAL BLADE TO PUNCTURE OR CUT.: Brand: BARD-PARKER ® CARBON RIB-BACK BLADES

## (undated) DEVICE — BIPOLAR SEALER 23-113-1 AQM 2.3: Brand: AQUAMANTYS™

## (undated) DEVICE — GLOVE SRG BIOGEL 8

## (undated) DEVICE — PENCIL ELECTROSURG E-Z CLEAN -0035H

## (undated) DEVICE — VESSEL LOOPS X-RAY DETECTABLE: Brand: DEROYAL

## (undated) DEVICE — WEBRIL 6 IN UNSTERILE

## (undated) DEVICE — 3M™ TEGADERM™ TRANSPARENT FILM DRESSING FRAME STYLE, 1628, 6 IN X 8 IN (15 CM X 20 CM), 10/CT 8CT/CASE: Brand: 3M™ TEGADERM™

## (undated) DEVICE — DRESSING MEPILEX AG BORDER 4 X 4 IN

## (undated) DEVICE — SUT MONOCRYL 2-0 CT-1 36 IN Y945H

## (undated) DEVICE — KERLIX BANDAGE ROLL: Brand: KERLIX

## (undated) DEVICE — LIGACLIP MCA MULTIPLE CLIP APPLIERS, 20 SMALL CLIPS: Brand: LIGACLIP

## (undated) DEVICE — DRESSING MEPILEX BORDER 4 X 12 IN

## (undated) DEVICE — PAD CAST 4 IN COTTON NON STERILE

## (undated) DEVICE — SYRINGE 20ML LL

## (undated) DEVICE — ACE WRAP 6 IN UNSTERILE

## (undated) DEVICE — 3M™ STERI-DRAPE™ U-DRAPE 1015: Brand: STERI-DRAPE™

## (undated) DEVICE — SUT VICRYL 1 CTX 36 IN J977H

## (undated) DEVICE — GLOVE INDICATOR PI UNDERGLOVE SZ 7 BLUE

## (undated) DEVICE — DRAPE SHEET THREE QUARTER

## (undated) DEVICE — LIGACLIP MCA MULTIPLE CLIP APPLIERS, 20 MEDIUM CLIPS: Brand: LIGACLIP

## (undated) DEVICE — SPECIMEN CONTAINER STERILE PEEL PACK

## (undated) DEVICE — GLOVE SRG BIOGEL 7.5

## (undated) DEVICE — SCD SEQUENTIAL COMPRESSION COMFORT SLEEVE MEDIUM KNEE LENGTH: Brand: KENDALL SCD

## (undated) DEVICE — GLOVE SRG BIOGEL ECLIPSE 6.5

## (undated) DEVICE — GAUZE SPONGES,16 PLY: Brand: CURITY

## (undated) DEVICE — ACE WRAP 4 IN UNSTERILE